# Patient Record
Sex: MALE | Race: WHITE | NOT HISPANIC OR LATINO | Employment: FULL TIME | URBAN - METROPOLITAN AREA
[De-identification: names, ages, dates, MRNs, and addresses within clinical notes are randomized per-mention and may not be internally consistent; named-entity substitution may affect disease eponyms.]

---

## 2023-08-15 LAB
6-ACETYLMORPHINE: <25 NG/ML
7-AMINOCLONAZEPAM: <25 NG/ML
ALPHA-HYDROXYALPRAZOLAM: <25 NG/ML
ALPHA-HYDROXYMIDAZOLAM: <25 NG/ML
ALPRAZOLAM: <25 NG/ML
AMPHETAMINE (PRESENCE) IN URINE BY SCREEN METHOD: ABNORMAL
BARBITURATES PRESENCE IN URINE BY SCREEN METHOD: ABNORMAL
CANNABINOIDS IN URINE BY SCREEN METHOD: ABNORMAL
CHLORDIAZEPOXIDE: <25 NG/ML
CLONAZEPAM: <25 NG/ML
COCAINE (PRESENCE) IN URINE BY SCREEN METHOD: ABNORMAL
CODEINE: <50 NG/ML
CREATINE, URINE FOR DRUG: 179.9 MG/DL
DIAZEPAM: <25 NG/ML
DRUG SCREEN COMMENT URINE: ABNORMAL
EDDP: <25 NG/ML
FENTANYL CONFIRMATION, URINE: <2.5 NG/ML
HYDROCODONE: <25 NG/ML
HYDROMORPHONE: <25 NG/ML
LORAZEPAM: <25 NG/ML
METHADONE CONFIRMATION,URINE: <25 NG/ML
MIDAZOLAM: <25 NG/ML
MORPHINE URINE: <50 NG/ML
NORDIAZEPAM: 325 NG/ML
NORFENTANYL: <2.5 NG/ML
NORHYDROCODONE: <25 NG/ML
NOROXYCODONE: <25 NG/ML
O-DESMETHYLTRAMADOL: <50 NG/ML
OXAZEPAM: 455 NG/ML
OXYCODONE: <25 NG/ML
OXYMORPHONE: <25 NG/ML
PHENCYCLIDINE (PRESENCE) IN URINE BY SCREEN METHOD: ABNORMAL
TEMAZEPAM: 851 NG/ML
TRAMADOL: <50 NG/ML
ZOLPIDEM METABOLITE (ZCA): <25 NG/ML
ZOLPIDEM: <25 NG/ML

## 2023-10-05 PROBLEM — R03.0 BLOOD PRESSURE ELEVATED WITHOUT HISTORY OF HTN: Status: ACTIVE | Noted: 2023-10-05

## 2023-10-05 PROBLEM — M54.12 CERVICAL RADICULOPATHY: Status: ACTIVE | Noted: 2023-10-05

## 2023-10-05 PROBLEM — K59.09 CHRONIC CONSTIPATION: Status: ACTIVE | Noted: 2023-10-05

## 2023-10-05 PROBLEM — J34.2 NASAL SEPTAL DEVIATION: Status: ACTIVE | Noted: 2023-10-05

## 2023-10-05 PROBLEM — J34.89 NASAL OBSTRUCTION: Status: ACTIVE | Noted: 2023-10-05

## 2023-10-05 PROBLEM — J45.909 ASTHMA (HHS-HCC): Status: ACTIVE | Noted: 2023-10-05

## 2023-10-05 PROBLEM — H81.10 BPPV (BENIGN PAROXYSMAL POSITIONAL VERTIGO): Status: ACTIVE | Noted: 2023-10-05

## 2023-10-05 PROBLEM — M47.816 LUMBAR SPONDYLOSIS: Status: ACTIVE | Noted: 2023-10-05

## 2023-10-05 PROBLEM — R35.1 NOCTURIA: Status: ACTIVE | Noted: 2023-10-05

## 2023-10-05 PROBLEM — M47.812 DJD (DEGENERATIVE JOINT DISEASE) OF CERVICAL SPINE: Status: ACTIVE | Noted: 2023-10-05

## 2023-10-05 PROBLEM — J34.3 HYPERTROPHY OF INFERIOR NASAL TURBINATE: Status: ACTIVE | Noted: 2023-10-05

## 2023-10-05 RX ORDER — ALBUTEROL SULFATE 90 UG/1
1-2 AEROSOL, METERED RESPIRATORY (INHALATION)
COMMUNITY
Start: 2014-07-11

## 2023-10-05 RX ORDER — MONTELUKAST SODIUM 10 MG/1
1 TABLET ORAL DAILY
COMMUNITY
Start: 2022-02-10

## 2023-10-05 RX ORDER — DIAZEPAM 10 MG/1
10 TABLET ORAL 2 TIMES DAILY PRN
COMMUNITY
End: 2023-10-09 | Stop reason: SDUPTHER

## 2023-10-05 RX ORDER — UBIDECARENONE 30 MG
CAPSULE ORAL
COMMUNITY

## 2023-10-09 ENCOUNTER — OFFICE VISIT (OUTPATIENT)
Dept: PRIMARY CARE | Facility: CLINIC | Age: 48
End: 2023-10-09
Payer: COMMERCIAL

## 2023-10-09 VITALS
SYSTOLIC BLOOD PRESSURE: 137 MMHG | BODY MASS INDEX: 25.78 KG/M2 | OXYGEN SATURATION: 98 % | DIASTOLIC BLOOD PRESSURE: 82 MMHG | HEART RATE: 70 BPM | HEIGHT: 68 IN | TEMPERATURE: 97.6 F | WEIGHT: 170.1 LBS

## 2023-10-09 DIAGNOSIS — M77.8 LEFT ELBOW TENDINITIS: ICD-10-CM

## 2023-10-09 DIAGNOSIS — M75.102 ROTATOR CUFF SYNDROME OF LEFT SHOULDER: Primary | ICD-10-CM

## 2023-10-09 DIAGNOSIS — H81.10 BENIGN PAROXYSMAL POSITIONAL VERTIGO, UNSPECIFIED LATERALITY: ICD-10-CM

## 2023-10-09 DIAGNOSIS — M25.512 ACUTE PAIN OF LEFT SHOULDER: ICD-10-CM

## 2023-10-09 PROCEDURE — 1036F TOBACCO NON-USER: CPT | Performed by: INTERNAL MEDICINE

## 2023-10-09 PROCEDURE — 99214 OFFICE O/P EST MOD 30 MIN: CPT | Performed by: INTERNAL MEDICINE

## 2023-10-09 RX ORDER — DICLOFENAC SODIUM 75 MG/1
75 TABLET, DELAYED RELEASE ORAL 2 TIMES DAILY PRN
Qty: 30 TABLET | Refills: 2 | Status: SHIPPED | OUTPATIENT
Start: 2023-10-09 | End: 2024-02-07 | Stop reason: ALTCHOICE

## 2023-10-09 RX ORDER — DIAZEPAM 10 MG/1
10 TABLET ORAL 2 TIMES DAILY PRN
Qty: 30 TABLET | Refills: 2 | Status: SHIPPED | OUTPATIENT
Start: 2023-10-09 | End: 2024-02-05 | Stop reason: SDUPTHER

## 2023-10-09 ASSESSMENT — ENCOUNTER SYMPTOMS
ARTHRALGIAS: 1
CHILLS: 0
FATIGUE: 0
NAUSEA: 0
COUGH: 0
DIARRHEA: 0
PALPITATIONS: 0
WHEEZING: 0
SHORTNESS OF BREATH: 0
CONSTIPATION: 0
ABDOMINAL PAIN: 0
FEVER: 0
NUMBNESS: 0

## 2023-10-09 ASSESSMENT — PATIENT HEALTH QUESTIONNAIRE - PHQ9
2. FEELING DOWN, DEPRESSED OR HOPELESS: NOT AT ALL
1. LITTLE INTEREST OR PLEASURE IN DOING THINGS: NOT AT ALL
SUM OF ALL RESPONSES TO PHQ9 QUESTIONS 1 AND 2: 0

## 2023-10-09 ASSESSMENT — PAIN SCALES - GENERAL: PAINLEVEL: 6

## 2023-10-09 NOTE — PROGRESS NOTES
"Subjective   Patient ID: Mika Salamanca is a 47 y.o. male who presents for Follow-up (Left sided shoulder and elbow pain ).    HPI     Left elbow pain, outside of elbow (lateral epicondyle) - started a while back.      Left shoulder pain started a few weeks ago.  Was doing some lifting - did not feel anything at the moment, but started after.  Has been using ice, ibuprofen.  No numbness, tingling.  Pain is radiating down left shoulder into arm.  Has had a similar injury in the past and did PT.  Prefers to do imaging first if possible.    Had called ~ 2 weeks ago for diazepam refill, was not sent in.    Review of Systems   Constitutional:  Negative for chills, fatigue and fever.   Respiratory:  Negative for cough, shortness of breath and wheezing.    Cardiovascular:  Negative for chest pain, palpitations and leg swelling.   Gastrointestinal:  Negative for abdominal pain, constipation, diarrhea and nausea.   Musculoskeletal:  Positive for arthralgias.   Skin:  Negative for rash.   Neurological:  Negative for numbness.       Objective   /82   Pulse 70   Temp 36.4 °C (97.6 °F) (Temporal)   Ht 1.727 m (5' 8\")   Wt 77.2 kg (170 lb 1.6 oz)   SpO2 98%   BMI 25.86 kg/m²     Physical Exam  Constitutional:       Appearance: Normal appearance.   HENT:      Head: Normocephalic and atraumatic.   Cardiovascular:      Rate and Rhythm: Normal rate and regular rhythm.   Pulmonary:      Effort: Pulmonary effort is normal. No respiratory distress.      Breath sounds: Normal breath sounds. No wheezing.   Musculoskeletal:      Comments: Tenderness of left lateral epicondyle, pain reproduced by forearm rotation.  Reduced active ROM of left shoulder, primarily anterior flexion.  Pain radiating along lateral shoulder with rotation, as well as down anterior upper arm into bicep.   Neurological:      General: No focal deficit present.      Mental Status: He is alert and oriented to person, place, and time. Mental status is " at baseline.   Psychiatric:         Mood and Affect: Mood normal.         Behavior: Behavior normal.         Thought Content: Thought content normal.         Judgment: Judgment normal.         Assessment/Plan   Problem List Items Addressed This Visit             ICD-10-CM    BPPV (benign paroxysmal positional vertigo) H81.10    Relevant Medications    diazePAM (Valium) 10 mg tablet     Other Visit Diagnoses         Codes    Rotator cuff syndrome of left shoulder    -  Primary M75.102    Relevant Medications    diclofenac (Voltaren) 75 mg EC tablet    Other Relevant Orders    MR arthrogram shoulder left    XR arthrogram shoulder left    Acute pain of left shoulder     M25.512    Relevant Medications    diclofenac (Voltaren) 75 mg EC tablet    Other Relevant Orders    MR arthrogram shoulder left    XR arthrogram shoulder left    Left elbow tendinitis     M77.8    Relevant Medications    diclofenac (Voltaren) 75 mg EC tablet          Left elbow tendinitis / tennis elbow - advised to use tennis elbow forearm brace.  Rx sent for Voltaren.    Left shoulder rotator cuff syndrome, possible tear with reduced active ROM - Xray and MRI ordered.  Rx for Voltaren.    BPPV - has been on diazepam for many years good good results.  CSA was updated 2/8/23.  OARRS reviewed.  Diazepam refilled.    Follow-up as scheduled.

## 2023-10-25 ENCOUNTER — TELEPHONE (OUTPATIENT)
Dept: PRIMARY CARE | Facility: CLINIC | Age: 48
End: 2023-10-25
Payer: COMMERCIAL

## 2023-10-25 DIAGNOSIS — M25.512 ACUTE PAIN OF LEFT SHOULDER: ICD-10-CM

## 2023-10-25 DIAGNOSIS — M75.102 ROTATOR CUFF SYNDROME OF LEFT SHOULDER: Primary | ICD-10-CM

## 2023-10-25 NOTE — TELEPHONE ENCOUNTER
PT CALLED    Insurance denied MRI L shoulder.  Per letter - pt will need p.t.     Can you place a p.t. referral.

## 2023-10-27 ENCOUNTER — EVALUATION (OUTPATIENT)
Dept: PHYSICAL THERAPY | Facility: CLINIC | Age: 48
End: 2023-10-27
Payer: COMMERCIAL

## 2023-10-27 DIAGNOSIS — M25.512 ACUTE PAIN OF LEFT SHOULDER: Primary | ICD-10-CM

## 2023-10-27 DIAGNOSIS — M75.102 ROTATOR CUFF SYNDROME OF LEFT SHOULDER: ICD-10-CM

## 2023-10-27 PROCEDURE — 97161 PT EVAL LOW COMPLEX 20 MIN: CPT | Mod: GP | Performed by: PHYSICAL THERAPIST

## 2023-10-27 ASSESSMENT — PAIN - FUNCTIONAL ASSESSMENT: PAIN_FUNCTIONAL_ASSESSMENT: 0-10

## 2023-10-27 ASSESSMENT — PAIN SCALES - GENERAL: PAINLEVEL_OUTOF10: 5 - MODERATE PAIN

## 2023-10-27 ASSESSMENT — ENCOUNTER SYMPTOMS
OCCASIONAL FEELINGS OF UNSTEADINESS: 0
DEPRESSION: 0
LOSS OF SENSATION IN FEET: 0

## 2023-10-27 NOTE — LETTER
October 27, 2023     Patient: Mika Salamanca   YOB: 1975   Date of Visit: 10/27/2023       To Whom it May Concern:    Mika Salamanca was seen in my clinic on 10/27/2023. He {Return to school/sport:67516}.    If you have any questions or concerns, please don't hesitate to call.         Sincerely,          Massimo Roach, PT        CC: No Recipients

## 2023-10-27 NOTE — LETTER
October 27, 2023    Massimo Roach, PT  [address]  Rehab Services  [facility] [address]    Patient: Mika Salamanca   YOB: 1975   Date of Visit: 10/27/2023       Dear Yaquelin Roldan Md  [address] [address]    The attached plan of care is being sent to you because your patient’s medical reimbursement requires that you certify the plan of care. Your signature is required to allow uninterrupted insurance coverage.      You may indicate your approval by signing below and faxing this form back to us at Dept Fax: [phone].    Please call Dept: [phone] with any questions or concerns.    Thank you for this referral,        Massimo Roach PT  [address] [address]    Payer: Payor: [employer]THEM / Plan: AN[employer]M HMP / Product Type: *No Product type* /                                                                         Date:     Dear Massimo Roach, PT,     Re: Mr. Mika Salamanca, MRN:[id number]    I certify that I have reviewed the attached plan of care and it is medically necessary for Mr. Mika Salamanca (1975) who is under my care.          ______________________________________                    _________________  Provider name and credentials                                           Date and time                                                                                           Plan of Care 10/27/23   Effective from: 10/27/2023  Effective to: 12/11/2023    Plan ID: [id number]            Participants as of Finalize on 10/27/2023    Name Type Comments Contact Info    Massimo Roach PT Physical Therapist  [phone]       Last Plan Note     Author: Massimo Roach PT Status: Sign when Signing Visit Last edited: 10/27/2023  1:30 PM       Physical Therapy    Physical Therapy Evaluation    Patient Name: Mika Salamanca  MRN: [id number]  Today's Date: 10/27/2023  Time Calculation  Start Time:  1340  Stop Time: 1415  Time Calculation (min): 35 min    Current Problem  Problem List Items Addressed This Visit    None  Visit Diagnoses         Codes    Acute pain of left shoulder    -  Primary M25.512    Relevant Orders    Follow Up In Physical Therapy    Rotator cuff syndrome of left shoulder     M75.102    Relevant Orders    Follow Up In Physical Therapy                  SUBJECTIVE  Subjective   General     Precautions  Precautions  Precautions Comment: none       Chief complaint: Pt reports he has had rotator cuff tears before.  Has had injections for them this feels exactly the same. Pt reports the end of Sept he was lifting up the washing machine or lifting the vacuum over the baby gate.     Pain  Pain Assessment: 0-10  Pain Score: 5 - Moderate pain    Pain ranges:  5-8/10  Increases with: reaching out or behind  Decreases with: nothing  Prior level of function: Ind with ADLs  Current functional level:  Pain limits the patient's ability to everything, picking up anything above shoulder height  Home set up: no concerns  Work status: mortgage underwriting    OBJECTIVE:  Upper Extremity Strength:  Date: eval RIGHT LEFT   Shoulder Flexion 4 4   Shoulder Abduction     Shoulder ER 4 4   Shoulder IR 4 4     Upper Extremity ROM:  Date: eval RIGHT LEFT   Shoulder Flexion 150 80     Joint mobility no assessed  Posture WNL  Palpation tender posterior shoulder and over biceps tendon  Neurological symptoms pain radiating down arm to elbow  Special tests:  SHOULDER RIGHT LEFT   Hawkin's-Jordan  +   Neer Impingement   +   Drop arm  -   Infraspinatus muscle test  -   ER lag sign  -   Lift off  -   Apprehension     Relocation     Cross-body adduction  -   Jerk test  -       Outcome Measures:  Other Measures  Oswestry Disablity Index (RENEE): 54     ASSESSMENT  The pt presents with signs and symptoms consistent with the medical diagnosis of L shoulder pain  The pt has impairments including decreased shoulder ROM and  strength. He has functional limitations which include difficulty with any task over shoulder height, reaching out or backward, lifting anything heavy.  The pt was not feeling well today due to vertigo so therefore eval was limited.  However today he had positive special tests that suggested shoulder impingement over a tear.  Will trial PT for 4-8 weeks.    The pt will benefit from skilled physical therapy to reduce impairments in order to return to prior level of function.     The physical therapy prognosis is good for the patient to achieve their goals.   The pt tolerated therapy treatment today well with no adverse effects.  Barriers to therapy include:  prior shoulder injurioes    PLAN  The pt will be seen 1 days a week for 6-8 weeks.        The pt has been educated about the risks and benefits of physical therapy including manual therapy treatments and gives consent for treatment.     The patient will benefit from physical therapy treatment to include: therapeutic exercises, therapeutic activities, neurological re-education, manual therapy, modalities, and a home exercise program.     The patient's potential to reach their therapy goals is excellent.     TREATMENT:  None today as pt was not feeling well.   Demo supine shoulder flexion and discussed pulleys at home    Goals:  Active       PT Problem       RENEE to 35       Start:  10/27/23    Expected End:  12/11/23            pain no higher than 2/10       Start:  10/27/23    Expected End:  12/11/23            L shoulder flexion AROM to at least 150 deg       Start:  10/27/23    Expected End:  12/11/23            L shoulder strength to at least 4+5       Start:  10/27/23    Expected End:  12/11/23            pt will be able to lift 8# (gallon of milk) without increase shoulder pain       Start:  10/27/23    Expected End:  12/11/23                                            Current Participants as of 10/27/2023    Name Type Comments Contact Info    Massimo Roach, PT  Physical Therapist  [phone]    Signature pending

## 2023-10-27 NOTE — LETTER
October 27, 2023     Patient: Mika Salamanca   YOB: 1975   Date of Visit: 10/27/2023       To Whom It May Concern:    It is my medical opinion that Mika Salamanca {Work release (duty restriction):16245}.    If you have any questions or concerns, please don't hesitate to call.         Sincerely,        Massimo Roach, PT    CC: No Recipients

## 2023-10-27 NOTE — PROGRESS NOTES
Physical Therapy    Physical Therapy Evaluation    Patient Name: Mika Salamanca  MRN: [id number]  Today's Date: 10/27/2023  Time Calculation  Start Time: 1340  Stop Time: 1415  Time Calculation (min): 35 min    Current Problem  Problem List Items Addressed This Visit    None  Visit Diagnoses         Codes    Acute pain of left shoulder    -  Primary M25.512    Relevant Orders    Follow Up In Physical Therapy    Rotator cuff syndrome of left shoulder     M75.102    Relevant Orders    Follow Up In Physical Therapy                  SUBJECTIVE  Subjective   General     Precautions  Precautions  Precautions Comment: none       Chief complaint: Pt reports he has had rotator cuff tears before.  Has had injections for them this feels exactly the same. Pt reports the end of Sept he was lifting up the washing machine or lifting the vacuum over the baby gate.     Pain  Pain Assessment: 0-10  Pain Score: 5 - Moderate pain    Pain ranges:  5-8/10  Increases with: reaching out or behind  Decreases with: nothing  Prior level of function: Ind with ADLs  Current functional level:  Pain limits the patient's ability to everything, picking up anything above shoulder height  Home set up: no concerns  Work status: mortgage underwriting    OBJECTIVE:  Upper Extremity Strength:  Date: eval RIGHT LEFT   Shoulder Flexion 4 4   Shoulder Abduction     Shoulder ER 4 4   Shoulder IR 4 4     Upper Extremity ROM:  Date: eval RIGHT LEFT   Shoulder Flexion 150 80     Joint mobility no assessed  Posture WNL  Palpation tender posterior shoulder and over biceps tendon  Neurological symptoms pain radiating down arm to elbow  Special tests:  SHOULDER RIGHT LEFT   Hawkin's-Jordan  +   Neer Impingement   +   Drop arm  -   Infraspinatus muscle test  -   ER lag sign  -   Lift off  -   Apprehension     Relocation     Cross-body adduction  -   Jerk test  -       Outcome Measures:  Other Measures  Oswestry Disablity Index (RENEE): 54     ASSESSMENT  The pt  presents with signs and symptoms consistent with the medical diagnosis of L shoulder pain  The pt has impairments including decreased shoulder ROM and strength. He has functional limitations which include difficulty with any task over shoulder height, reaching out or backward, lifting anything heavy.  The pt was not feeling well today due to vertigo so therefore eval was limited.  However today he had positive special tests that suggested shoulder impingement over a tear.  Will trial PT for 4-8 weeks.    The pt will benefit from skilled physical therapy to reduce impairments in order to return to prior level of function.     The physical therapy prognosis is good for the patient to achieve their goals.   The pt tolerated therapy treatment today well with no adverse effects.  Barriers to therapy include:  prior shoulder injurioes    PLAN  The pt will be seen 1 days a week for 6-8 weeks.        The pt has been educated about the risks and benefits of physical therapy including manual therapy treatments and gives consent for treatment.     The patient will benefit from physical therapy treatment to include: therapeutic exercises, therapeutic activities, neurological re-education, manual therapy, modalities, and a home exercise program.     The patient's potential to reach their therapy goals is excellent.     TREATMENT:  None today as pt was not feeling well.   Demo supine shoulder flexion and discussed pulleys at home    Goals:  Active       PT Problem       RENEE to 35       Start:  10/27/23    Expected End:  12/11/23            pain no higher than 2/10       Start:  10/27/23    Expected End:  12/11/23            L shoulder flexion AROM to at least 150 deg       Start:  10/27/23    Expected End:  12/11/23            L shoulder strength to at least 4+5       Start:  10/27/23    Expected End:  12/11/23            pt will be able to lift 8# (gallon of milk) without increase shoulder pain       Start:  10/27/23    Expected  End:  12/11/23

## 2023-11-02 ENCOUNTER — APPOINTMENT (OUTPATIENT)
Dept: RADIOLOGY | Facility: HOSPITAL | Age: 48
End: 2023-11-02
Payer: COMMERCIAL

## 2023-11-03 ENCOUNTER — TREATMENT (OUTPATIENT)
Dept: PHYSICAL THERAPY | Facility: CLINIC | Age: 48
End: 2023-11-03
Payer: COMMERCIAL

## 2023-11-03 DIAGNOSIS — M25.512 ACUTE PAIN OF LEFT SHOULDER: ICD-10-CM

## 2023-11-03 DIAGNOSIS — M75.102 ROTATOR CUFF SYNDROME OF LEFT SHOULDER: ICD-10-CM

## 2023-11-03 PROCEDURE — 97140 MANUAL THERAPY 1/> REGIONS: CPT | Mod: GP | Performed by: PHYSICAL THERAPIST

## 2023-11-03 PROCEDURE — 97110 THERAPEUTIC EXERCISES: CPT | Mod: GP | Performed by: PHYSICAL THERAPIST

## 2023-11-03 ASSESSMENT — PAIN SCALES - GENERAL: PAINLEVEL_OUTOF10: 3

## 2023-11-03 ASSESSMENT — PAIN - FUNCTIONAL ASSESSMENT: PAIN_FUNCTIONAL_ASSESSMENT: 0-10

## 2023-11-03 NOTE — PROGRESS NOTES
Physical Therapy    Physical Therapy Treatment    Patient Name: Mika Salamanca  MRN: [id number]  Today's Date: 11/3/2023  Visit #2  Time Calculation  Start Time: 1015  Stop Time: 1100  Time Calculation (min): 45 min    Visit number: 2    Current Problem  Problem List Items Addressed This Visit    None  Visit Diagnoses         Codes    Rotator cuff syndrome of left shoulder     M75.102    Acute pain of left shoulder     M25.512            Subjective :  Pt reports he is doing better today since the vertigo has improved.     Precautions  Precautions  Precautions Comment: none    Pain  Pain Assessment: 0-10  Pain Score: 3    Objective   No objective measures assessed this visit       Assessment:     Pt is  just beginning therapy.  This session exercises all exercises were new or reviewed from the initial HEP.  The pt required min to mod cues and demonstration to complete exercises with proper form.  Pt responded to all activities without adverse effects.  PT tolerated manual well and had piccae over upper trap region.  Pt continues to lack strength and ROM necessary and continues to have pain which limimts the completion of baseline ADLs.  Pt will benefit from further therapy to continue to progress towards meeting functional and impairment goals.       Plan:     Continue to progress treatment to improve strength, range of motion, joint mobility, pain, and flexibility impairments which are impacting patient's function.  Treatments:    Therapeutic ex:  Stepper 5 min for UE  Pulleys 3 min  Seated ball roll out 2 min FWD, 2 min sideways  Pulldowns and mid rows RTB x 20 ea  Shoulder ER and IR RTB x 20  Pull aparts RTB x 10 ea    Neuro Re-ed: none today    Manual:  IASTM to L shoulder in sitting  Pt educated regarding manual therapy risks and benefits.  Pt given opportunity to stop if needed.  Pt aware and agrees.   Access Code: [email or web address]  URL: [email or web address]  Date: 11/03/2023  Prepared by:  Massimo Marley    Exercises  - Standing Shoulder Row with Anchored Resistance  - 1 x daily - 7 x weekly - 3 sets - 10 reps  - Shoulder extension with resistance - Neutral  - 1 x daily - 7 x weekly - 3 sets - 10 reps  - Shoulder Internal Rotation with Resistance  - 1 x daily - 7 x weekly - 3 sets - 10 reps  - Shoulder External Rotation with Anchored Resistance  - 1 x daily - 7 x weekly - 3 sets - 10 reps  - Shoulder External Rotation and Scapular Retraction with Resistance  - 1 x daily - 7 x weekly - 3 sets - 10 reps  - Standing Shoulder Horizontal Abduction with Resistance  - 1 x daily - 7 x weekly - 3 sets - 10 reps  - Standing Shoulder Horizontal Abduction with Resistance  - 1 x daily - 7 x weekly - 3 sets - 10 reps  - Standing Shoulder Diagonal Horizontal Abduction 60/120 Degrees with Resistance  - 1 x daily - 7 x weekly - 3 sets - 10 reps  Goals:  Active       PT Problem       RENEE to 35       Start:  10/27/23    Expected End:  12/11/23            pain no higher than 2/10       Start:  10/27/23    Expected End:  12/11/23            L shoulder flexion AROM to at least 150 deg       Start:  10/27/23    Expected End:  12/11/23            L shoulder strength to at least 4+5       Start:  10/27/23    Expected End:  12/11/23            pt will be able to lift 8# (gallon of milk) without increase shoulder pain       Start:  10/27/23    Expected End:  12/11/23

## 2023-11-06 ENCOUNTER — OFFICE VISIT (OUTPATIENT)
Dept: PRIMARY CARE | Facility: CLINIC | Age: 48
End: 2023-11-06
Payer: COMMERCIAL

## 2023-11-06 VITALS
BODY MASS INDEX: 26.07 KG/M2 | OXYGEN SATURATION: 95 % | WEIGHT: 172 LBS | TEMPERATURE: 96.7 F | SYSTOLIC BLOOD PRESSURE: 126 MMHG | HEIGHT: 68 IN | DIASTOLIC BLOOD PRESSURE: 79 MMHG | HEART RATE: 56 BPM

## 2023-11-06 DIAGNOSIS — H81.10 BENIGN PAROXYSMAL POSITIONAL VERTIGO, UNSPECIFIED LATERALITY: Primary | ICD-10-CM

## 2023-11-06 PROCEDURE — 99213 OFFICE O/P EST LOW 20 MIN: CPT | Performed by: INTERNAL MEDICINE

## 2023-11-06 PROCEDURE — 1036F TOBACCO NON-USER: CPT | Performed by: INTERNAL MEDICINE

## 2023-11-06 ASSESSMENT — ENCOUNTER SYMPTOMS
DIZZINESS: 1
NAUSEA: 0
ARTHRALGIAS: 1
DIARRHEA: 0
PALPITATIONS: 0
FEVER: 0
BACK PAIN: 0
CHILLS: 0
ABDOMINAL PAIN: 0
CONSTIPATION: 0
SHORTNESS OF BREATH: 0
FATIGUE: 0
COUGH: 0
DYSURIA: 0
HEMATURIA: 0
WHEEZING: 0

## 2023-11-06 ASSESSMENT — PAIN SCALES - GENERAL: PAINLEVEL: 0-NO PAIN

## 2023-11-06 NOTE — PROGRESS NOTES
Subjective   Patient ID: Mika Salamanca is a 48 y.o. male who presents for Med Refill (Diazepam refill ).    HPI     Has started PT for left shoulder - no significant changes yet.  Does not need refill on diazepam yet today - he uses occasionally for vertigo, works well.     OARRS:  Yaquelin Roldan MD on 11/6/2023 12:44 PM  I have personally reviewed the OARRS report for Mika Salamanca. I have considered the risks of abuse, dependence, addiction and diversion and I believe that it is clinically appropriate for Mika Salamanca to be prescribed this medication    Is the patient prescribed a combination of a benzodiazepine and opioid?  No    Last Urine Drug Screen / ordered today: No  Recent Results (from the past 8760 hour(s))   OPIATE/OPIOID/BENZO PRESCRIPTION COMPLIANCE    Collection Time: 08/09/23  8:34 AM   Result Value Ref Range    DRUG SCREEN COMMENT URINE SEE BELOW     Creatine, Urine 179.9 mg/dL    Amphetamine Screen, Urine PRESUMPTIVE NEGATIVE NEGATIVE    Barbiturate Screen, Urine PRESUMPTIVE NEGATIVE NEGATIVE    Cannabinoid Screen, Urine PRESUMPTIVE NEGATIVE NEGATIVE    Cocaine Screen, Urine PRESUMPTIVE NEGATIVE NEGATIVE    PCP Screen, Urine PRESUMPTIVE NEGATIVE NEGATIVE    7-Aminoclonazepam <25 Cutoff <25 ng/mL    Alpha-Hydroxyalprazolam <25 Cutoff <25 ng/mL    Alpha-Hydroxymidazolam <25 Cutoff <25 ng/mL    Alprazolam <25 Cutoff <25 ng/mL    Chlordiazepoxide <25 Cutoff <25 ng/mL    Clonazepam <25 Cutoff <25 ng/mL    Diazepam <25 Cutoff <25 ng/mL    Lorazepam <25 Cutoff <25 ng/mL    Midazolam <25 Cutoff <25 ng/mL    Nordiazepam 325 (A) Cutoff <25 ng/mL    Oxazepam 455 (A) Cutoff <25 ng/mL    Temazepam 851 (A) Cutoff <25 ng/mL    Zolpidem <25 Cutoff <25 ng/mL    Zolpidem Metabolite (ZCA) <25 Cutoff <25 ng/mL    6-Acetylmorphine <25 Cutoff <25 ng/mL    Codeine <50 Cutoff <50 ng/mL    Hydrocodone <25 Cutoff <25 ng/mL    Hydromorphone <25 Cutoff <25 ng/mL    Morphine Urine <50 Cutoff <50  "ng/mL    Norhydrocodone <25 Cutoff <25 ng/mL    Noroxycodone <25 Cutoff <25 ng/mL    Oxycodone <25 Cutoff <25 ng/mL    Oxymorphone <25 Cutoff <25 ng/mL    Tramadol <50 Cutoff <50 ng/mL    O-Desmethyltramadol <50 Cutoff <50 ng/mL    Fentanyl <2.5 Cutoff<2.5 ng/mL    Norfentanyl <2.5 Cutoff<2.5 ng/mL    METHADONE CONFIRMATION,URINE <25 Cutoff <25 ng/mL    EDDP <25 Cutoff <25 ng/mL     Results are as expected.         Controlled Substance Agreement:  Date of the Last Agreement: 2/8/23  Reviewed Controlled Substance Agreement including but not limited to the benefits, risks, and alternatives to treatment with a Controlled Substance medication(s).    Benzodiazepines:  What is the patient's goal of therapy? Relief of intermittent vertigo  Is this being achieved with current treatment? yes    DESTIN-7:  No data recorded n/a    Activities of Daily Living:   Is your overall impression that this patient is benefiting (symptom reduction outweighs side effects) from benzodiazepine therapy? Yes     1. Physical Functioning: Better  2. Family Relationship: Better  3. Social Relationship: Better  4. Mood: Better  5. Sleep Patterns: Better  6. Overall Function: Better    Review of Systems   Constitutional:  Negative for chills, fatigue and fever.   Respiratory:  Negative for cough, shortness of breath and wheezing.    Cardiovascular:  Negative for chest pain, palpitations and leg swelling.   Gastrointestinal:  Negative for abdominal pain, constipation, diarrhea and nausea.   Genitourinary:  Negative for dysuria and hematuria.   Musculoskeletal:  Positive for arthralgias. Negative for back pain and gait problem.   Skin:  Negative for rash.   Neurological:  Positive for dizziness.       Objective   /79   Pulse 56   Temp 35.9 °C (96.7 °F) (Temporal)   Ht 1.727 m (5' 8\")   Wt 78 kg (172 lb)   SpO2 95%   BMI 26.15 kg/m²     Physical Exam  Constitutional:       Appearance: Normal appearance.   HENT:      Head: Normocephalic and " atraumatic.   Cardiovascular:      Rate and Rhythm: Normal rate and regular rhythm.   Pulmonary:      Effort: Pulmonary effort is normal. No respiratory distress.      Breath sounds: Normal breath sounds. No wheezing.   Neurological:      General: No focal deficit present.      Mental Status: He is alert and oriented to person, place, and time. Mental status is at baseline.   Psychiatric:         Mood and Affect: Mood normal.         Behavior: Behavior normal.         Thought Content: Thought content normal.         Judgment: Judgment normal.         Assessment/Plan   Problem List Items Addressed This Visit             ICD-10-CM    BPPV (benign paroxysmal positional vertigo) - Primary H81.10     BPPV - has been on diazepam for many years good good results.  CSA was updated 2/8/23.  OARRS reviewed.   UDS 8/9/23.  Does not need refill today.    Left rotator cuff syndrome - currently in PT.  Follow-up if no improvement.    BP is in better range today, will continue to monitor.    Follow-up in 90 days.

## 2023-11-10 ENCOUNTER — TREATMENT (OUTPATIENT)
Dept: PHYSICAL THERAPY | Facility: CLINIC | Age: 48
End: 2023-11-10
Payer: COMMERCIAL

## 2023-11-10 DIAGNOSIS — M25.512 ACUTE PAIN OF LEFT SHOULDER: ICD-10-CM

## 2023-11-10 DIAGNOSIS — M75.102 ROTATOR CUFF SYNDROME OF LEFT SHOULDER: Primary | ICD-10-CM

## 2023-11-10 PROCEDURE — 97140 MANUAL THERAPY 1/> REGIONS: CPT | Mod: GP | Performed by: PHYSICAL THERAPIST

## 2023-11-10 PROCEDURE — 97110 THERAPEUTIC EXERCISES: CPT | Mod: GP | Performed by: PHYSICAL THERAPIST

## 2023-11-10 ASSESSMENT — PAIN - FUNCTIONAL ASSESSMENT: PAIN_FUNCTIONAL_ASSESSMENT: 0-10

## 2023-11-10 ASSESSMENT — PAIN SCALES - GENERAL: PAINLEVEL_OUTOF10: 3

## 2023-11-10 NOTE — PROGRESS NOTES
Physical Therapy    Physical Therapy Treatment    Patient Name: Mika Salamanca  MRN: [id number]  Today's Date: 11/10/2023  Visit #3/7  Time Calculation  Start Time: 1102  Stop Time: 1145  Time Calculation (min): 43 min    Visit number: 2    Current Problem  Problem List Items Addressed This Visit             ICD-10-CM    Rotator cuff syndrome of left shoulder - Primary M75.102    Acute pain of left shoulder M25.512         Subjective :  Pt reports the shoulder is about the same and still limited in AROM however feels like the IASTM helped a little      Precautions  Precautions  Precautions Comment: none    Pain  Pain Assessment: 0-10  Pain Score: 3    Objective   No objective measures assessed this visit       Assessment:     Pt is progressing slowly.  Added a few new exercises today, toelrated well  This session exercises all exercises were new or reviewed from the initial HEP.  The pt required min to mod cues and demonstration to complete exercises with proper form.  Pt responded to all activities without adverse effects.  PT tolerated manual well and had piccae over upper trap region.  Pt continues to lack strength and ROM necessary and continues to have pain which limimts the completion of baseline ADLs.  Pt will benefit from further therapy to continue to progress towards meeting functional and impairment goals.       Plan:     Continue to progress treatment to improve strength, range of motion, joint mobility, pain, and flexibility impairments which are impacting patient's function.    Treatments:  Therapeutic ex:  Stepper 5 min for UE  Pulleys 3 min  Seated ball roll out 2 min FWD, 2 min sideways  Pulldowns and mid rows GTB x 30 ea  Shoulder ER and IR GTB x 20  Pull aparts GTB x 10 ea  ER at door at 45 deg with cane. NEW  Wall walks RTB x 5 NEW    Neuro Re-ed: none today    Manual:  IASTM to L shoulder in sitting  Pt educated regarding manual therapy risks and benefits.  Pt given opportunity to stop if  needed.  Pt aware and agrees.   GOALS:  Active       PT Problem       RENEE to 35       Start:  10/27/23    Expected End:  12/11/23            pain no higher than 2/10       Start:  10/27/23    Expected End:  12/11/23            L shoulder flexion AROM to at least 150 deg       Start:  10/27/23    Expected End:  12/11/23            L shoulder strength to at least 4+5       Start:  10/27/23    Expected End:  12/11/23            pt will be able to lift 8# (gallon of milk) without increase shoulder pain       Start:  10/27/23    Expected End:  12/11/23

## 2023-11-17 ENCOUNTER — TREATMENT (OUTPATIENT)
Dept: PHYSICAL THERAPY | Facility: CLINIC | Age: 48
End: 2023-11-17
Payer: COMMERCIAL

## 2023-11-17 DIAGNOSIS — M25.512 ACUTE PAIN OF LEFT SHOULDER: ICD-10-CM

## 2023-11-17 DIAGNOSIS — M75.102 ROTATOR CUFF SYNDROME OF LEFT SHOULDER: ICD-10-CM

## 2023-11-17 PROCEDURE — 97140 MANUAL THERAPY 1/> REGIONS: CPT | Mod: GP | Performed by: PHYSICAL THERAPIST

## 2023-11-17 PROCEDURE — 97110 THERAPEUTIC EXERCISES: CPT | Mod: GP | Performed by: PHYSICAL THERAPIST

## 2023-11-17 NOTE — PROGRESS NOTES
Physical Therapy    Physical Therapy Treatment    Patient Name: Mika Salamanca  MRN: [id number]  Today's Date: 11/17/2023  Visit #4/7  Time Calculation  Start Time: 1101  Stop Time: 1140  Time Calculation (min): 39 min        Current Problem  Problem List Items Addressed This Visit             ICD-10-CM    Rotator cuff syndrome of left shoulder M75.102    Acute pain of left shoulder M25.512         Subjective :  Pt reports he was doing well until this morning when he tried to pull his shirt off over his head.  Pain much higher again. No number given    Precautions       Pain       Objective   No objective measures assessed this visit       Assessment:     Pt is progressing slowly.  Only did stretching today and spent more time on manual trying to increase blood flow for decreased pain.  The pt required min to mod cues and demonstration to complete exercises with proper form.  Pt responded to all activities without adverse effects.  PT tolerated manual well and  was educated about bruising and tape  Pt continues to lack strength and ROM necessary and continues to have pain which limimts the completion of baseline ADLs.  Pt will benefit from further therapy to continue to progress towards meeting functional and impairment goals.       Plan:     Continue to progress treatment to improve strength, range of motion, joint mobility, pain, and flexibility impairments which are impacting patient's function.    Treatments:  Therapeutic ex:  Stepper 5 min for UE  Pulleys 3 min  Seated ball roll out 2 min FWD, 2 min sideways  ER at door at 45 deg with cane. NEW\  Kinesiotape applied to increase blood flow  HELD  Pulldowns and mid rows GTB x 30 ea  Shoulder ER and IR GTB x 20  Pull aparts GTB x 10 ea  Wall walks RTB x 5 NEW    Neuro Re-ed: none today    Manual:  Cupping  for 4 min statis and 4 min gliding.  To L shoulder in sitting  Pt educated regarding manual therapy risks and benefits.  Pt given opportunity to stop if  needed.  Pt aware and agrees.   GOALS:  Active       PT Problem       RENEE to 35       Start:  10/27/23    Expected End:  12/11/23            pain no higher than 2/10       Start:  10/27/23    Expected End:  12/11/23            L shoulder flexion AROM to at least 150 deg       Start:  10/27/23    Expected End:  12/11/23            L shoulder strength to at least 4+5       Start:  10/27/23    Expected End:  12/11/23            pt will be able to lift 8# (gallon of milk) without increase shoulder pain       Start:  10/27/23    Expected End:  12/11/23

## 2023-11-24 ENCOUNTER — DOCUMENTATION (OUTPATIENT)
Dept: PHYSICAL THERAPY | Facility: CLINIC | Age: 48
End: 2023-11-24

## 2023-11-24 ENCOUNTER — TELEMEDICINE (OUTPATIENT)
Dept: PRIMARY CARE | Facility: CLINIC | Age: 48
End: 2023-11-24
Payer: COMMERCIAL

## 2023-11-24 ENCOUNTER — APPOINTMENT (OUTPATIENT)
Dept: PHYSICAL THERAPY | Facility: CLINIC | Age: 48
End: 2023-11-24
Payer: COMMERCIAL

## 2023-11-24 DIAGNOSIS — J01.90 ACUTE NON-RECURRENT SINUSITIS, UNSPECIFIED LOCATION: Primary | ICD-10-CM

## 2023-11-24 PROCEDURE — 99213 OFFICE O/P EST LOW 20 MIN: CPT

## 2023-11-24 RX ORDER — AMOXICILLIN AND CLAVULANATE POTASSIUM 875; 125 MG/1; MG/1
875 TABLET, FILM COATED ORAL 2 TIMES DAILY
Qty: 20 TABLET | Refills: 0 | Status: SHIPPED | OUTPATIENT
Start: 2023-11-24 | End: 2023-12-04

## 2023-11-24 NOTE — PROGRESS NOTES
Subjective   Patient ID: Mika Salamanca is a 48 y.o. male who presents for Sinusitis (On demand virtual care visit).  HPI  Mika presents via on demand virtual visit for sinus symptoms that started 11/14/2023.  He has experienced fever, sore throat, congestion, runny nose, and cough.   Fever just started.   Sinus pressure in face, under eyes and in forehead.  He has tired mucinex, sinus cold medication.   No chills or body aches.  Mostly drainage down his throat and into chest.   Mucous is green in color when he coughs it up or blows his nose.   Does not feel like he is getting better since it started.     Past Surgical History:   Procedure Laterality Date    OTHER SURGICAL HISTORY  07/11/2014    Leg Repair    OTHER SURGICAL HISTORY  04/24/2019    Complete colonoscopy      Past Medical History:   Diagnosis Date    Abdominal distension (gaseous) 03/21/2022    Abdominal bloating    Abnormal findings on diagnostic imaging of other abdominal regions, including retroperitoneum 04/04/2019    Abnormal CT of the abdomen    COVID-19 08/01/2022    COVID-19 virus infection    Generalized abdominal pain 03/15/2019    Generalized abdominal pain    Lumbago with sciatica, right side 03/09/2017    Acute right-sided low back pain with right-sided sciatica    Metatarsalgia, left foot 07/11/2014    Metatarsalgia of left foot    Myalgia, other site 04/14/2021    Gluteal pain    Other conditions influencing health status 02/22/2018    History of cough    Other general symptoms and signs 03/19/2019    Abnormal finding    Pain in right knee 03/09/2017    Acute pain of right knee    Pelvic and perineal pain 03/15/2019    Pelvic pain in male    Personal history of other diseases of male genital organs 08/08/2018    History of prostatitis    Personal history of other diseases of the digestive system 04/04/2019    History of ileus    Personal history of other diseases of the musculoskeletal system and connective tissue 03/15/2019     History of back pain    Personal history of other diseases of the respiratory system 09/20/2019    History of acute sinusitis    Personal history of other diseases of the respiratory system     History of asthma    Personal history of other diseases of the respiratory system     History of bronchitis    Personal history of other diseases of the respiratory system 04/20/2015    History of acute sinusitis    Personal history of other specified conditions 04/04/2019    History of abdominal pain    Personal history of other specified conditions 09/16/2019    History of fatigue    Right upper quadrant pain 02/10/2022    RUQ pain    Strain of muscle, fascia and tendon of the posterior muscle group at thigh level, left thigh, subsequent encounter 06/16/2021    Hamstring strain, left, subsequent encounter    Strain of muscle, fascia and tendon of the posterior muscle group at thigh level, right thigh, subsequent encounter 05/12/2021    Partial hamstring tear, right, subsequent encounter    Strain of muscle, fascia and tendon of the posterior muscle group at thigh level, unspecified thigh, initial encounter 04/13/2021    Hamstring tear    Strain of muscle, fascia and tendon of the posterior muscle group at thigh level, unspecified thigh, initial encounter 05/26/2021    Hamstring tear    Strain of other muscles, fascia and tendons at shoulder and upper arm level, right arm, initial encounter 01/21/2016    Strain of right trapezius muscle    Strain of unspecified muscle, fascia and tendon at shoulder and upper arm level, right arm, initial encounter 07/11/2014    Right shoulder strain     Social History     Tobacco Use    Smoking status: Former     Types: Cigarettes    Smokeless tobacco: Never   Substance Use Topics    Alcohol use: Yes     Comment: 4-6 per week    Drug use: Never        Review of Systems  10 point review of systems performed and is negative except as noted in the HPI.      Current Outpatient Medications:      albuterol 90 mcg/actuation inhaler, Inhale 1-2 puffs. every 4-6 hours as needed for wheezing, Disp: , Rfl:     amoxicillin-pot clavulanate (Augmentin) 875-125 mg tablet, Take 1 tablet (875 mg) by mouth 2 times a day for 10 days., Disp: 20 tablet, Rfl: 0    diazePAM (Valium) 10 mg tablet, Take 1 tablet (10 mg) by mouth 2 times a day as needed for anxiety (vertigo)., Disp: 30 tablet, Rfl: 2    diclofenac (Voltaren) 75 mg EC tablet, Take 1 tablet (75 mg) by mouth 2 times a day as needed (pain). Do not crush, chew, or split., Disp: 30 tablet, Rfl: 2    L. acidophilus/Bifid. animalis 32 billion cell capsule, Probiotic CAPS  Refills: 0      Start : 4-Apr-2019  Active, Disp: , Rfl:     montelukast (Singulair) 10 mg tablet, Take 1 tablet (10 mg) by mouth once daily. As directed, Disp: , Rfl:     multivit-min-FA-lycopen-lutein (ESSENTIAL Man) 0.4-2-250 mg-mg-mcg tablet, Multi For Him Oral Tablet  Refills: 0     Active, Disp: , Rfl:      Objective   There were no vitals taken for this visit.    Physical Exam - virtual     Assessment/Plan   Problem List Items Addressed This Visit    None  Visit Diagnoses       Acute non-recurrent sinusitis, unspecified location    -  Primary    Relevant Medications    amoxicillin-pot clavulanate (Augmentin) 875-125 mg tablet        Sinusitis   Start augmentin - twice a day for 10 days   Continue to increase fluids   Discussed following up with PCP if symptoms do not improve  If symptoms change or worsen please go to urgent care or the ER    Discussed at visit any disease processes that were of concern as well as the risks, benefits and instructions on any new medication provided. Patient (and/or caretaker of patient if present) stated all questions were answered, and they voiced understanding of instructions.      A video visit between the patient (at the originating site) and the provider (at the distant site) was utilized to provide this telehealth service.     Verbal consent was requested  and obtained from the patient on this date for a telehealth visit. The patient was informed about the telehealth clinical encounter including benefits to avoiding travel, limitations to the assessment, and billing for the service. In person care may be recommended if needed. Telehealth sessions are not being recorded and personal health information is protected. All questions were answered and verbal informal consent was obtained from the patient to proceed.     Total time spent on phone with patient: 4 minutes  Total time spent documentin minutes

## 2023-11-24 NOTE — PROGRESS NOTES
Physical Therapy                 Therapy Communication Note    Patient Name: Mika Salamanca  MRN: [id number]  Today's Date: 11/24/2023     Discipline: Physical Therapy    Missed Visit Reason:  ill    Missed Time: Cancel    Comment:

## 2023-12-01 ENCOUNTER — TREATMENT (OUTPATIENT)
Dept: PHYSICAL THERAPY | Facility: CLINIC | Age: 48
End: 2023-12-01
Payer: COMMERCIAL

## 2023-12-01 DIAGNOSIS — M25.512 ACUTE PAIN OF LEFT SHOULDER: ICD-10-CM

## 2023-12-01 DIAGNOSIS — M75.102 ROTATOR CUFF SYNDROME OF LEFT SHOULDER: ICD-10-CM

## 2023-12-01 PROCEDURE — 97140 MANUAL THERAPY 1/> REGIONS: CPT | Mod: GP | Performed by: PHYSICAL THERAPIST

## 2023-12-01 PROCEDURE — 97110 THERAPEUTIC EXERCISES: CPT | Mod: GP | Performed by: PHYSICAL THERAPIST

## 2023-12-01 NOTE — PROGRESS NOTES
Physical Therapy    Physical Therapy Treatment    Patient Name: Mika Salamanca  MRN: [id number]  Today's Date: 12/1/2023  Visit #5/7  Time Calculation  Start Time: 1420  Stop Time: 1500  Time Calculation (min): 40 min        Current Problem  Problem List Items Addressed This Visit             ICD-10-CM    Rotator cuff syndrome of left shoulder M75.102    Acute pain of left shoulder M25.512         Subjective :  Pt reports he is better than last time but still sore. No pain number given  Precautions       Pain       Objective   No objective measures assessed this visit       Assessment:     Pt is progressing slowly.  Continued to focus on manual as pt is compliant with his HEP.    Pt responded to all activities without adverse effects.  PT tolerated manual well and  was educated about bruising.  Pt continues to lack strength and ROM necessary and continues to have pain which limimts the completion of baseline ADLs.  Pt will benefit from further therapy to continue to progress towards meeting functional and impairment goals.       Plan:     Continue to progress treatment to improve strength, range of motion, joint mobility, pain, and flexibility impairments which are impacting patient's function.    Treatments:  Therapeutic ex:  Stepper 5 min for UE  Pulleys 3 min  HELD:  Seated ball roll out 2 min FWD, 2 min sideways  ER at door at 45 deg with cane. NEW\  Kinesiotape applied to increase blood flow  Pulldowns and mid rows GTB x 30 ea  Shoulder ER and IR GTB x 20  Pull aparts GTB x 10 ea  Wall walks RTB x 5 NEW    Neuro Re-ed: none today  NV add body blade and ball drop/catch  Manual:  STM and IASTM to L shoulder, RTC, rhomboids and upper trap  Cupping  for 4 min statis and 4 min gliding.  To L shoulder in sitting  Pt educated regarding manual therapy risks and benefits.  Pt given opportunity to stop if needed.  Pt aware and agrees.   GOALS:  Active       PT Problem       RENEE to 35       Start:  10/27/23     Expected End:  12/11/23            pain no higher than 2/10       Start:  10/27/23    Expected End:  12/11/23            L shoulder flexion AROM to at least 150 deg       Start:  10/27/23    Expected End:  12/11/23            L shoulder strength to at least 4+5       Start:  10/27/23    Expected End:  12/11/23            pt will be able to lift 8# (gallon of milk) without increase shoulder pain       Start:  10/27/23    Expected End:  12/11/23

## 2023-12-08 ENCOUNTER — TREATMENT (OUTPATIENT)
Dept: PHYSICAL THERAPY | Facility: CLINIC | Age: 48
End: 2023-12-08
Payer: COMMERCIAL

## 2023-12-08 DIAGNOSIS — M75.102 ROTATOR CUFF SYNDROME OF LEFT SHOULDER: ICD-10-CM

## 2023-12-08 DIAGNOSIS — M25.512 ACUTE PAIN OF LEFT SHOULDER: ICD-10-CM

## 2023-12-08 PROCEDURE — 97110 THERAPEUTIC EXERCISES: CPT | Mod: GP | Performed by: PHYSICAL THERAPIST

## 2023-12-08 PROCEDURE — 97112 NEUROMUSCULAR REEDUCATION: CPT | Mod: GP | Performed by: PHYSICAL THERAPIST

## 2023-12-08 PROCEDURE — 97140 MANUAL THERAPY 1/> REGIONS: CPT | Mod: GP | Performed by: PHYSICAL THERAPIST

## 2023-12-08 NOTE — PROGRESS NOTES
Physical Therapy    Physical Therapy Treatment    Patient Name: Mika Salamanca  MRN: [id number]  Today's Date: 12/8/2023  Visit #6/7  Time Calculation  Start Time: 1102  Stop Time: 1145  Time Calculation (min): 43 min        Current Problem  Problem List Items Addressed This Visit             ICD-10-CM    Rotator cuff syndrome of left shoulder M75.102    Acute pain of left shoulder M25.512         Subjective :  Pt reports he is better than last time but still sore. No pain number given  Precautions       Pain       Objective   No objective measures assessed this visit       Assessment:     Pt is progressing slowly.  Continued to focus on manual as pt is compliant with his HEP.    Pt responded to all activities without adverse effects.  PT tolerated manual well and  was educated about bruising.  Pt continues to lack strength and ROM necessary and continues to have pain which limimts the completion of baseline ADLs.  Pt will benefit from further therapy to continue to progress towards meeting functional and impairment goals.       Plan:     Continue to progress treatment to improve strength, range of motion, joint mobility, pain, and flexibility impairments which are impacting patient's function.    Treatments:  Therapeutic ex:  Stepper 5 min for UE  Pulleys 3 min  Is, Ys and Ts 1# x 30    HELD:  Seated ball roll out 2 min FWD, 2 min sideways  ER at door at 45 deg with cane. NEW  Kinesiotape applied to increase blood flow  Pulldowns and mid rows GTB x 30 ea  Shoulder ER and IR GTB x 20  Pull aparts GTB x 10 ea  Wall walks RTB x 5 NEW    Neuro Re-ed:   NV add body blade and ball drop/catch 1 min ea x 2 rounds  Manual:  STM to L shoulder, RTC, rhomboids and upper trap  Cupping  for 4 min statis and 4 min gliding.  To L shoulder in sitting  Pt educated regarding manual therapy risks and benefits.  Pt given opportunity to stop if needed.  Pt aware and agrees.   GOALS:  Active       PT Problem       RENEE to 35        Start:  10/27/23    Expected End:  12/11/23            pain no higher than 2/10       Start:  10/27/23    Expected End:  12/11/23            L shoulder flexion AROM to at least 150 deg       Start:  10/27/23    Expected End:  12/11/23            L shoulder strength to at least 4+5       Start:  10/27/23    Expected End:  12/11/23            pt will be able to lift 8# (gallon of milk) without increase shoulder pain       Start:  10/27/23    Expected End:  12/11/23

## 2023-12-15 ENCOUNTER — TREATMENT (OUTPATIENT)
Dept: PHYSICAL THERAPY | Facility: CLINIC | Age: 48
End: 2023-12-15
Payer: COMMERCIAL

## 2023-12-15 DIAGNOSIS — M75.102 ROTATOR CUFF SYNDROME OF LEFT SHOULDER: ICD-10-CM

## 2023-12-15 DIAGNOSIS — M25.512 ACUTE PAIN OF LEFT SHOULDER: ICD-10-CM

## 2023-12-15 PROCEDURE — 97110 THERAPEUTIC EXERCISES: CPT | Mod: GP | Performed by: PHYSICAL THERAPIST

## 2023-12-15 PROCEDURE — 97140 MANUAL THERAPY 1/> REGIONS: CPT | Mod: GP | Performed by: PHYSICAL THERAPIST

## 2023-12-15 PROCEDURE — 97112 NEUROMUSCULAR REEDUCATION: CPT | Mod: GP | Performed by: PHYSICAL THERAPIST

## 2023-12-15 NOTE — PROGRESS NOTES
Physical Therapy    Physical Therapy Treatment and discharge    Patient Name: Mika Salamanca  MRN: [id number]  Today's Date: 12/15/2023  Visit #7/7  Time Calculation  Start Time: 1017  Stop Time: 1101  Time Calculation (min): 44 min        Current Problem  Problem List Items Addressed This Visit             ICD-10-CM    Rotator cuff syndrome of left shoulder M75.102    Acute pain of left shoulder M25.512         Subjective :  Pt reports he is the same.  Pain has been about a 6/10  Precautions       Pain       Objective   Upper Extremity Strength:  Date: eval RIGHT LEFT   Shoulder Flexion 4+ 4 pain   Shoulder Abduction       Shoulder ER 4+ 4   Shoulder IR 4+ 4      Upper Extremity ROM:  Date: eval RIGHT LEFT   Shoulder Flexion 150 120            Outcome Measures:  Other Measures  DASH 26, a little worse          Assessment:        Pt was referred for shoulder pain.  Pt has progressed slowly.  He continues to have pain in the left shoulder at rest and with most activities.  Strength did not improve much due to pain and being unable to tolerate much resistance.  ROM improved moderately but is now WNL.       He is completely independent with HEP.  Pt may benefit from imaging to determine extent of soft tissue injury in shoulder and referral to ortho for further follow-up  Plan:  The POC is discharged as of today.     Plan:     Discharge to [facility], referred back to primary    Treatments:  Therapeutic ex:  Stepper 5 min for UE  Pulleys 3 min  Is, Ys and Ts 1# x 30 HELD  Seated ball roll out 2 min FWD, 2 min sideways HELD  ER at door at 45 deg with cane.HELD   Kinesiotape applied to increase blood flow HELD  Pulldowns and mid rows BlackTB x 30 ea  Shoulder ER and IR BlackTB x 20  Pull aparts blackTB x 10 ea  Wall walks RTB x 5     Neuro Re-ed:    body blade and ball drop/catch 1 min ea x 2 rounds    Manual:  STM to L shoulder, RTC, rhomboids and upper trap  Cupping  for 4 min static and 4 min gliding.  To L shoulder in  sitting  Pt educated regarding manual therapy risks and benefits.  Pt given opportunity to stop if needed.  Pt aware and agrees.   GOALS:  Active       PT Problem       DASH to 35 (Progressing)       Start:  10/27/23    Expected End:  12/11/23       DASH 26         pain no higher than 2/10 (Not Progressing)       Start:  10/27/23    Expected End:  12/11/23         Goal Note       Pain has been a 6/10 the past week              L shoulder flexion AROM to at least 150 deg (Progressing)       Start:  10/27/23    Expected End:  12/11/23         Goal Note       ROM to 120              L shoulder strength to at least 4+5 (Not Progressing)       Start:  10/27/23    Expected End:  12/11/23         Goal Note       Unable to fully strength test due to pain              pt will be able to lift 8# (gallon of milk) without increase shoulder pain (Not Progressing)       Start:  10/27/23    Expected End:  12/11/23         Goal Note       Unable to lift anything heavy past his elbow                  Statement Selected

## 2024-01-05 ENCOUNTER — TELEPHONE (OUTPATIENT)
Dept: PRIMARY CARE | Facility: CLINIC | Age: 49
End: 2024-01-05
Payer: COMMERCIAL

## 2024-01-05 NOTE — TELEPHONE ENCOUNTER
Patient called stating his insurance denied the MRI until patient did P.T.  Patient states his physical therapist said to contact his physician.  Patient still having issues.  Patient asking if the MRI order is still good or if he needs a new order.  Patient phone [phone].

## 2024-01-09 DIAGNOSIS — M25.512 ACUTE PAIN OF LEFT SHOULDER: ICD-10-CM

## 2024-01-09 DIAGNOSIS — M75.102 ROTATOR CUFF SYNDROME OF LEFT SHOULDER: Primary | ICD-10-CM

## 2024-01-09 NOTE — TELEPHONE ENCOUNTER
Pt called again.  He was looking in MyChart that the MRI is without contrast.  Pt stated it was originally scheduled with contrast.    Please clarify

## 2024-01-12 NOTE — TELEPHONE ENCOUNTER
Pt called   I read pt drs reply today  He was told by physical therapist it should be with contrast - concerned about possible torn labium.    Pt was advised  does not have Friday office hours and message would be responded on 1/15/24    Pt# [phone]

## 2024-01-29 ENCOUNTER — APPOINTMENT (OUTPATIENT)
Dept: RADIOLOGY | Facility: CLINIC | Age: 49
End: 2024-01-29
Payer: COMMERCIAL

## 2024-02-02 RX ORDER — IBUPROFEN 200 MG
600 TABLET ORAL EVERY 6 HOURS PRN
COMMUNITY

## 2024-02-02 RX ORDER — HYDROCODONE BITARTRATE AND ACETAMINOPHEN 5; 325 MG/1; MG/1
1 TABLET ORAL
COMMUNITY
Start: 2023-08-23

## 2024-02-05 ENCOUNTER — OFFICE VISIT (OUTPATIENT)
Dept: PRIMARY CARE | Facility: CLINIC | Age: 49
End: 2024-02-05
Payer: COMMERCIAL

## 2024-02-05 VITALS
HEART RATE: 71 BPM | TEMPERATURE: 97.1 F | SYSTOLIC BLOOD PRESSURE: 160 MMHG | DIASTOLIC BLOOD PRESSURE: 93 MMHG | WEIGHT: 176 LBS | BODY MASS INDEX: 26.67 KG/M2 | OXYGEN SATURATION: 98 % | HEIGHT: 68 IN

## 2024-02-05 DIAGNOSIS — H81.10 BENIGN PAROXYSMAL POSITIONAL VERTIGO, UNSPECIFIED LATERALITY: ICD-10-CM

## 2024-02-05 DIAGNOSIS — J45.20 MILD INTERMITTENT ASTHMA WITHOUT COMPLICATION (HHS-HCC): Primary | ICD-10-CM

## 2024-02-05 PROCEDURE — 99213 OFFICE O/P EST LOW 20 MIN: CPT | Performed by: INTERNAL MEDICINE

## 2024-02-05 PROCEDURE — 1036F TOBACCO NON-USER: CPT | Performed by: INTERNAL MEDICINE

## 2024-02-05 RX ORDER — DIAZEPAM 10 MG/1
10 TABLET ORAL 2 TIMES DAILY PRN
Qty: 30 TABLET | Refills: 1 | Status: SHIPPED | OUTPATIENT
Start: 2024-02-05

## 2024-02-05 ASSESSMENT — PATIENT HEALTH QUESTIONNAIRE - PHQ9
SUM OF ALL RESPONSES TO PHQ9 QUESTIONS 1 AND 2: 0
2. FEELING DOWN, DEPRESSED OR HOPELESS: NOT AT ALL
1. LITTLE INTEREST OR PLEASURE IN DOING THINGS: NOT AT ALL

## 2024-02-05 ASSESSMENT — PAIN SCALES - GENERAL: PAINLEVEL: 0-NO PAIN

## 2024-02-05 NOTE — PROGRESS NOTES
CHIEF COMPLAINT  Follow-up (Diazepam follow up )    HISTORY OF PRESENT ILLNESS  Mika Salamanca is a 48 y.o. male presents today for follow up of Follow-up (Diazepam follow up )    HPI    Completed PT for left shoulder pain.  No improvement.   MRI ordered to assess for possible labrum tear - this is scheduled.    BP is elevated today.  Has not been as active lately as he would like to be.  His knee bothers him occasionally as well, plans to follow-up with orthopedist.     Continues to have occasional flare-ups of vertigo - the diazepam helps for flare-ups.   Needs refill today.  If there is any significant change in frequency or severity of flare-ups, should reassess.  Has had these symptoms for years.     OARRS:  Yaquelin Roldan MD on 2/5/2024  3:50 PM  I have personally reviewed the OARRS report for Mika Salamanca. I have considered the risks of abuse, dependence, addiction and diversion and I believe that it is clinically appropriate for Mika Salamanca to be prescribed this medication    Is the patient prescribed a combination of a benzodiazepine and opioid?  No    Last Urine Drug Screen / ordered today: No  Recent Results (from the past 8760 hour(s))   OPIATE/OPIOID/BENZO PRESCRIPTION COMPLIANCE    Collection Time: 08/09/23  8:34 AM   Result Value Ref Range    DRUG SCREEN COMMENT URINE SEE BELOW     Creatine, Urine 179.9 mg/dL    Amphetamine Screen, Urine PRESUMPTIVE NEGATIVE NEGATIVE    Barbiturate Screen, Urine PRESUMPTIVE NEGATIVE NEGATIVE    Cannabinoid Screen, Urine PRESUMPTIVE NEGATIVE NEGATIVE    Cocaine Screen, Urine PRESUMPTIVE NEGATIVE NEGATIVE    PCP Screen, Urine PRESUMPTIVE NEGATIVE NEGATIVE    7-Aminoclonazepam <25 Cutoff <25 ng/mL    Alpha-Hydroxyalprazolam <25 Cutoff <25 ng/mL    Alpha-Hydroxymidazolam <25 Cutoff <25 ng/mL    Alprazolam <25 Cutoff <25 ng/mL    Chlordiazepoxide <25 Cutoff <25 ng/mL    Clonazepam <25 Cutoff <25 ng/mL    Diazepam <25 Cutoff <25 ng/mL     Lorazepam <25 Cutoff <25 ng/mL    Midazolam <25 Cutoff <25 ng/mL    Nordiazepam 325 (A) Cutoff <25 ng/mL    Oxazepam 455 (A) Cutoff <25 ng/mL    Temazepam 851 (A) Cutoff <25 ng/mL    Zolpidem <25 Cutoff <25 ng/mL    Zolpidem Metabolite (ZCA) <25 Cutoff <25 ng/mL    6-Acetylmorphine <25 Cutoff <25 ng/mL    Codeine <50 Cutoff <50 ng/mL    Hydrocodone <25 Cutoff <25 ng/mL    Hydromorphone <25 Cutoff <25 ng/mL    Morphine Urine <50 Cutoff <50 ng/mL    Norhydrocodone <25 Cutoff <25 ng/mL    Noroxycodone <25 Cutoff <25 ng/mL    Oxycodone <25 Cutoff <25 ng/mL    Oxymorphone <25 Cutoff <25 ng/mL    Tramadol <50 Cutoff <50 ng/mL    O-Desmethyltramadol <50 Cutoff <50 ng/mL    Fentanyl <2.5 Cutoff<2.5 ng/mL    Norfentanyl <2.5 Cutoff<2.5 ng/mL    METHADONE CONFIRMATION,URINE <25 Cutoff <25 ng/mL    EDDP <25 Cutoff <25 ng/mL     Results are as expected.         Controlled Substance Agreement:  Date of the Last Agreement: 24  Reviewed Controlled Substance Agreement including but not limited to the benefits, risks, and alternatives to treatment with a Controlled Substance medication(s).    Benzodiazepines:  What is the patient's goal of therapy? Relief of vertigo   Is this being achieved with current treatment? yes      Activities of Daily Living:   Is your overall impression that this patient is benefiting (symptom reduction outweighs side effects) from benzodiazepine therapy?     1. Physical Functionin. Family Relationship: Same  3. Social Relationship: Same  4. Mood: Same  5. Sleep Patterns: Same  6. Overall Function: Better    REVIEW OF SYSTEMS  Review of Systems   Constitutional:  Negative for chills, fatigue and fever.   Respiratory:  Negative for cough, shortness of breath and wheezing.    Cardiovascular:  Negative for chest pain, palpitations and leg swelling.   Gastrointestinal:  Negative for abdominal pain, constipation, diarrhea and nausea.   Genitourinary:  Negative for dysuria and hematuria.  "  Musculoskeletal:  Positive for arthralgias. Negative for back pain and gait problem.   Skin:  Negative for rash.   Neurological:  Negative for headaches.       ALLERGIES  Sulfamethoxazole-trimethoprim    MEDICATIONS  Current Outpatient Medications   Medication Instructions    albuterol 90 mcg/actuation inhaler 1-2 puffs, inhalation, every 4-6 hours as needed for wheezing<BR>    diazePAM (VALIUM) 10 mg, oral, 2 times daily PRN    HYDROcodone-acetaminophen (Norco) 5-325 mg tablet 1 tablet, oral    ibuprofen (Motrin IB) 200 mg tablet     L. acidophilus/Bifid. animalis 32 billion cell capsule Probiotic CAPS   Refills: 0        Start : 4-Apr-2019   Active    montelukast (Singulair) 10 mg tablet 1 tablet, oral, Daily, As directed    multivit-min-FA-lycopen-lutein (ESSENTIAL Man) 0.4-2-250 mg-mg-mcg tablet Multi For Him Oral Tablet   Refills: 0       Active       TOBACCO USE  Social History     Tobacco Use   Smoking Status Former    Types: Cigarettes   Smokeless Tobacco Never       DEPRESSION SCREEN  Over the past 2 weeks, how often have you been bothered by any of the following problems?  Little interest or pleasure in doing things: Not at all  Feeling down, depressed, or hopeless: Not at all    SURGICAL HISTORY  Past Surgical History:  07/11/2014: OTHER SURGICAL HISTORY      Comment:  Leg Repair  04/24/2019: OTHER SURGICAL HISTORY      Comment:  Complete colonoscopy       OBJECTIVE    BP (!) 160/93   Pulse 71   Temp 36.2 °C (97.1 °F) (Temporal)   Ht 1.727 m (5' 8\")   Wt 79.8 kg (176 lb)   SpO2 98%   BMI 26.76 kg/m²    BMI: Estimated body mass index is 26.76 kg/m² as calculated from the following:    Height as of this encounter: 1.727 m (5' 8\").    Weight as of this encounter: 79.8 kg (176 lb).    BP Readings from Last 3 Encounters:   02/05/24 (!) 160/93   11/06/23 126/79   10/09/23 137/82      Wt Readings from Last 3 Encounters:   02/05/24 79.8 kg (176 lb)   11/06/23 78 kg (172 lb)   10/09/23 77.2 kg (170 lb 1.6 " oz)        PHYSICAL EXAM  Physical Exam  Constitutional:       Appearance: Normal appearance.   HENT:      Head: Normocephalic and atraumatic.   Cardiovascular:      Rate and Rhythm: Normal rate and regular rhythm.      Heart sounds: No murmur heard.  Pulmonary:      Effort: Pulmonary effort is normal. No respiratory distress.      Breath sounds: Normal breath sounds. No wheezing.   Musculoskeletal:      Right lower leg: No edema.      Left lower leg: No edema.   Skin:     Findings: No rash.   Neurological:      General: No focal deficit present.      Mental Status: He is alert and oriented to person, place, and time. Mental status is at baseline.   Psychiatric:         Mood and Affect: Mood normal.         Behavior: Behavior normal.         Thought Content: Thought content normal.         Judgment: Judgment normal.          ASSESSMENT AND PLAN  Assessment/Plan   Problem List Items Addressed This Visit       Asthma - Primary    BPPV (benign paroxysmal positional vertigo)    Relevant Medications    diazePAM (Valium) 10 mg tablet     BPPV - has been on diazepam for many years good good results.  CSA was updated 2/5/24.  OARRS reviewed.   UDS 8/9/23.  Refill sent.  Continue follow-up every 90 days.    Asthma - no active issues, uses albuterol PRN.    MRI to evaluate left shoulder is scheduled.

## 2024-02-07 ASSESSMENT — ENCOUNTER SYMPTOMS
FATIGUE: 0
HEADACHES: 0
DYSURIA: 0
WHEEZING: 0
CONSTIPATION: 0
NAUSEA: 0
CHILLS: 0
ARTHRALGIAS: 1
BACK PAIN: 0
COUGH: 0
DIARRHEA: 0
FEVER: 0
PALPITATIONS: 0
ABDOMINAL PAIN: 0
HEMATURIA: 0
SHORTNESS OF BREATH: 0

## 2024-02-15 ENCOUNTER — HOSPITAL ENCOUNTER (OUTPATIENT)
Dept: RADIOLOGY | Facility: HOSPITAL | Age: 49
Discharge: HOME | End: 2024-02-15
Payer: COMMERCIAL

## 2024-02-15 DIAGNOSIS — M25.512 ACUTE PAIN OF LEFT SHOULDER: ICD-10-CM

## 2024-02-15 DIAGNOSIS — M75.102 ROTATOR CUFF SYNDROME OF LEFT SHOULDER: ICD-10-CM

## 2024-02-15 PROCEDURE — 73222 MRI JOINT UPR EXTREM W/DYE: CPT | Mod: LT

## 2024-02-15 PROCEDURE — 73040 CONTRAST X-RAY OF SHOULDER: CPT | Mod: LT

## 2024-02-15 PROCEDURE — 2550000001 HC RX 255 CONTRASTS: Performed by: INTERNAL MEDICINE

## 2024-02-15 PROCEDURE — 2500000005 HC RX 250 GENERAL PHARMACY W/O HCPCS: Performed by: INTERNAL MEDICINE

## 2024-02-15 PROCEDURE — 77002 NEEDLE LOCALIZATION BY XRAY: CPT | Mod: LEFT SIDE | Performed by: RADIOLOGY

## 2024-02-15 PROCEDURE — 73222 MRI JOINT UPR EXTREM W/DYE: CPT | Mod: LEFT SIDE | Performed by: RADIOLOGY

## 2024-02-15 PROCEDURE — 23350 INJECTION FOR SHOULDER X-RAY: CPT | Mod: LEFT SIDE | Performed by: RADIOLOGY

## 2024-02-15 PROCEDURE — A9575 INJ GADOTERATE MEGLUMI 0.1ML: HCPCS | Performed by: INTERNAL MEDICINE

## 2024-02-15 RX ORDER — LIDOCAINE HYDROCHLORIDE 10 MG/ML
10 INJECTION, SOLUTION EPIDURAL; INFILTRATION; INTRACAUDAL; PERINEURAL
Status: COMPLETED | OUTPATIENT
Start: 2024-02-15 | End: 2024-02-15

## 2024-02-15 RX ORDER — GADOTERATE MEGLUMINE 376.9 MG/ML
0.1 INJECTION INTRAVENOUS
Status: COMPLETED | OUTPATIENT
Start: 2024-02-15 | End: 2024-02-15

## 2024-02-15 RX ADMIN — GADOTERATE MEGLUMINE 0.1 ML: 376.9 INJECTION INTRAVENOUS at 14:55

## 2024-02-15 RX ADMIN — IOHEXOL 5 ML: 350 INJECTION, SOLUTION INTRAVENOUS at 14:55

## 2024-02-15 RX ADMIN — LIDOCAINE HYDROCHLORIDE 100 MG: 10 INJECTION, SOLUTION EPIDURAL; INFILTRATION; INTRACAUDAL; PERINEURAL at 14:55

## 2024-02-16 DIAGNOSIS — M75.102 TEAR OF LEFT ROTATOR CUFF, UNSPECIFIED TEAR EXTENT, UNSPECIFIED WHETHER TRAUMATIC: Primary | ICD-10-CM

## 2024-02-26 ENCOUNTER — E-VISIT (OUTPATIENT)
Dept: PRIMARY CARE | Facility: CLINIC | Age: 49
End: 2024-02-26
Payer: COMMERCIAL

## 2024-02-26 ENCOUNTER — APPOINTMENT (OUTPATIENT)
Dept: PRIMARY CARE | Facility: CLINIC | Age: 49
End: 2024-02-26
Payer: COMMERCIAL

## 2024-02-26 DIAGNOSIS — J01.90 ACUTE NON-RECURRENT SINUSITIS, UNSPECIFIED LOCATION: Primary | ICD-10-CM

## 2024-02-26 PROCEDURE — 99421 OL DIG E/M SVC 5-10 MIN: CPT | Performed by: NURSE PRACTITIONER

## 2024-02-26 RX ORDER — AMOXICILLIN AND CLAVULANATE POTASSIUM 875; 125 MG/1; MG/1
1 TABLET, FILM COATED ORAL 2 TIMES DAILY
Qty: 20 TABLET | Refills: 0 | Status: SHIPPED | OUTPATIENT
Start: 2024-02-26 | End: 2024-03-14 | Stop reason: ALTCHOICE

## 2024-02-26 ASSESSMENT — LIFESTYLE VARIABLES
HISTORY_OF_SMOKING: I SMOKED IN THE PAST, BUT NOT REGULARLY
HISTORY_OF_SMOKING: I SMOKED IN THE PAST, BUT NOT REGULARLY

## 2024-02-26 NOTE — TELEPHONE ENCOUNTER
Reviewed pt symptoms and feel pt is suffering from sinusitis  Will Rx antibiotic and encourage pt to continue with Flonase as needed and other supportive measures offered.   Allergies: Bactrim

## 2024-03-01 ENCOUNTER — HOSPITAL ENCOUNTER (OUTPATIENT)
Dept: RADIOLOGY | Facility: CLINIC | Age: 49
Discharge: HOME | End: 2024-03-01
Payer: COMMERCIAL

## 2024-03-01 ENCOUNTER — OFFICE VISIT (OUTPATIENT)
Dept: ORTHOPEDIC SURGERY | Facility: CLINIC | Age: 49
End: 2024-03-01
Payer: COMMERCIAL

## 2024-03-01 DIAGNOSIS — M75.102 TEAR OF LEFT ROTATOR CUFF, UNSPECIFIED TEAR EXTENT, UNSPECIFIED WHETHER TRAUMATIC: ICD-10-CM

## 2024-03-01 DIAGNOSIS — S43.003A SUBLUXATION OF TENDON OF LONG HEAD OF BICEPS: ICD-10-CM

## 2024-03-01 DIAGNOSIS — S46.812S FULL THICKNESS TEAR OF LEFT SUBSCAPULARIS TENDON, SEQUELA: Primary | ICD-10-CM

## 2024-03-01 PROBLEM — S46.812A FULL THICKNESS TEAR OF LEFT SUBSCAPULARIS TENDON: Status: ACTIVE | Noted: 2024-03-01

## 2024-03-01 PROCEDURE — 73030 X-RAY EXAM OF SHOULDER: CPT | Mod: LT

## 2024-03-01 PROCEDURE — 73030 X-RAY EXAM OF SHOULDER: CPT | Mod: LEFT SIDE | Performed by: RADIOLOGY

## 2024-03-01 PROCEDURE — 99204 OFFICE O/P NEW MOD 45 MIN: CPT | Performed by: STUDENT IN AN ORGANIZED HEALTH CARE EDUCATION/TRAINING PROGRAM

## 2024-03-01 PROCEDURE — 1036F TOBACCO NON-USER: CPT | Performed by: STUDENT IN AN ORGANIZED HEALTH CARE EDUCATION/TRAINING PROGRAM

## 2024-03-01 ASSESSMENT — PAIN SCALES - GENERAL: PAINLEVEL_OUTOF10: 2

## 2024-03-01 ASSESSMENT — PAIN - FUNCTIONAL ASSESSMENT: PAIN_FUNCTIONAL_ASSESSMENT: 0-10

## 2024-03-01 NOTE — PROGRESS NOTES
Mika Salamanca is a 48 y.o. year-old  male  he is a new patient to our office and presents with a chief complaint of Left shoulder pain.  He notes that he had an injury last fall when he was lifting something heavy and felt pain.  It has been ongoing since then.  He has been doing physical therapy without relief.  Very active johanna jesus works out and has had difficulty with his activities due to the pain in his shoulder.  Pain is primarily anterior does radiate into the biceps lateral as well      Past Medical, Family, and Social History reviewed     Review of Systems  A complete review of systems was conducted, pertinent only to the HPI noted above.    Physical Exam  GEN: Alert and Oriented x 3  Constitutional: Well appearing, in no apparent distress.  Eyes: sclera anicteric  ENT: hearing appropriate for normal conversation, neck appears symmetric with no gross thyromegaly  Pulm: No labored breathing, no wheezing  CVS: Regular rate and rhythm  PSY: normal mood and affect  Skin: No rashes, erythema, or induration around shoulder     Focused Musculoskeletal Exam:     Side: Left shoulder:  PROM:   FE (170)   ER 60 ABER/ABIR: 90/90  AROM:   FE (120)   ER 45 IR T8  Strength:  Supra [5/5] Infra [5/5] Subscap [5/5]  Abd [5/5]  Pain with belly press and lift off  Tenderness to palpation over the biceps tendon        ACJ:  AC TTP: [neg]  Cross Arm [neg]  AC prominence [no]      [Sensation intact Ax/median/ulnar/radial distributions  Motor intact Ax/median/radial/ulnar/AIN/PIN    X-rays and MRI arthrogram of the shoulder independently viewed and interpreted: Full-thickness tear of the upper border of the subscapularis with biceps subluxation and tendinopathy probable full-thickness small tear of the anterior border of the supraspinatus    Patient was prescribed a [abduction sling for [subscapularis tear]. The patient has weakness, instability and/or deformity of their [Left shoulder which requires stabilization from  this orthosis to improve their function.  Verbal and written instructions for the use, wear schedule, cleaning and application of this item were given.  Patient was instructed that should the brace result in increased pain, decreased sensation, increased swelling, or an overall worsening of their medical condition, to please contact our office immediately. Orthotic management and training was provided for skin care, modifications due to healing tissues, edema changes, interruption in skin integrity, and safety precautions with the orthosis.      The patient history, physical examination and imaging studies are consistent with upper border subscap tear with biceps subluxation likely supraspinatus tear. The treatment options including medical management and surgery including rotator cuff repair and biceps tenodesis were discussed at length. I discussed at length the importance of post-operative rehab and the risk for adhesive capsulitis if this therapy is not appropriately attended. The risks and benefits of the surgery including but not limited to bleeding, infection, nerve injury, and potential future surgery were also presented and reviewed. I discussed at length the fact that this surgery is designed to address pain and that recovery of strength is not predictable. All questions were answered. The patient acknowledged the explanation, agreed to proceed with the plan.

## 2024-03-04 ENCOUNTER — TELEPHONE (OUTPATIENT)
Dept: ORTHOPEDIC SURGERY | Facility: HOSPITAL | Age: 49
End: 2024-03-04
Payer: COMMERCIAL

## 2024-03-06 ENCOUNTER — TRANSCRIBE ORDERS (OUTPATIENT)
Dept: OPERATING ROOM | Facility: HOSPITAL | Age: 49
End: 2024-03-06
Payer: COMMERCIAL

## 2024-03-06 DIAGNOSIS — S46.812S STRAIN OF OTHER MUSCLES, FASCIA AND TENDONS AT SHOULDER AND UPPER ARM LEVEL, LEFT ARM, SEQUELA: Primary | ICD-10-CM

## 2024-03-06 DIAGNOSIS — S43.003A UNSPECIFIED SUBLUXATION OF UNSPECIFIED SHOULDER JOINT, INITIAL ENCOUNTER: ICD-10-CM

## 2024-03-14 ENCOUNTER — OFFICE VISIT (OUTPATIENT)
Dept: PRIMARY CARE | Facility: CLINIC | Age: 49
End: 2024-03-14
Payer: COMMERCIAL

## 2024-03-14 VITALS
WEIGHT: 174.5 LBS | TEMPERATURE: 97.5 F | HEART RATE: 63 BPM | OXYGEN SATURATION: 96 % | DIASTOLIC BLOOD PRESSURE: 101 MMHG | SYSTOLIC BLOOD PRESSURE: 172 MMHG | BODY MASS INDEX: 26.45 KG/M2 | HEIGHT: 68 IN

## 2024-03-14 DIAGNOSIS — R43.8 METALLIC TASTE: ICD-10-CM

## 2024-03-14 DIAGNOSIS — I10 WHITE COAT SYNDROME WITH DIAGNOSIS OF HYPERTENSION: ICD-10-CM

## 2024-03-14 DIAGNOSIS — J01.00 ACUTE NON-RECURRENT MAXILLARY SINUSITIS: Primary | ICD-10-CM

## 2024-03-14 PROCEDURE — 1036F TOBACCO NON-USER: CPT | Performed by: INTERNAL MEDICINE

## 2024-03-14 PROCEDURE — 3080F DIAST BP >= 90 MM HG: CPT | Performed by: INTERNAL MEDICINE

## 2024-03-14 PROCEDURE — 3077F SYST BP >= 140 MM HG: CPT | Performed by: INTERNAL MEDICINE

## 2024-03-14 PROCEDURE — 99213 OFFICE O/P EST LOW 20 MIN: CPT | Performed by: INTERNAL MEDICINE

## 2024-03-14 RX ORDER — FLUTICASONE PROPIONATE 50 MCG
SPRAY, SUSPENSION (ML) NASAL
COMMUNITY
Start: 2018-10-17

## 2024-03-14 RX ORDER — DOXYCYCLINE 100 MG/1
100 CAPSULE ORAL 2 TIMES DAILY
Qty: 28 CAPSULE | Refills: 0 | Status: SHIPPED | OUTPATIENT
Start: 2024-03-14 | End: 2024-03-28

## 2024-03-14 RX ORDER — PREDNISONE 10 MG/1
TABLET ORAL
Qty: 30 TABLET | Refills: 0 | Status: SHIPPED | OUTPATIENT
Start: 2024-03-14 | End: 2024-03-24

## 2024-03-14 ASSESSMENT — ENCOUNTER SYMPTOMS
PALPITATIONS: 0
CHILLS: 0
SINUS PRESSURE: 1
SORE THROAT: 0
SHORTNESS OF BREATH: 0
FEVER: 0
FATIGUE: 0
SINUS PAIN: 0
COUGH: 0

## 2024-03-14 ASSESSMENT — PATIENT HEALTH QUESTIONNAIRE - PHQ9
1. LITTLE INTEREST OR PLEASURE IN DOING THINGS: NOT AT ALL
2. FEELING DOWN, DEPRESSED OR HOPELESS: NOT AT ALL
SUM OF ALL RESPONSES TO PHQ9 QUESTIONS 1 AND 2: 0

## 2024-03-14 NOTE — PROGRESS NOTES
CHIEF COMPLAINT  Sinus Problem (1 month congestion post nasal drip )    HISTORY OF PRESENT ILLNESS  Mika Salamanca is a 48 y.o. male presents today for follow up of Sinus Problem (1 month congestion post nasal drip )    HPI    Postnasal drip, congestion, pressure in sinuses.  Metallic taste in mouth.  Had fever at one point, did web visit and was prescribed antibiotic.     Throat is dry.  No sore throat, no ear pain.    Antibiotic was Augmetin x 7 days.  The metallic taste started before the antibiotic.     Using Flonase and nasal irrigation.    BP at home 115-125 / 80-85.    REVIEW OF SYSTEMS  Review of Systems   Constitutional:  Negative for chills, fatigue and fever.   HENT:  Positive for congestion, postnasal drip and sinus pressure. Negative for ear pain, sinus pain and sore throat.    Respiratory:  Negative for cough and shortness of breath.    Cardiovascular:  Negative for chest pain, palpitations and leg swelling.       ALLERGIES  Sulfamethoxazole-trimethoprim    MEDICATIONS  Current Outpatient Medications   Medication Instructions    albuterol 90 mcg/actuation inhaler 1-2 puffs, inhalation, every 4-6 hours as needed for wheezing<BR>    diazePAM (VALIUM) 10 mg, oral, 2 times daily PRN    doxycycline (VIBRAMYCIN) 100 mg, oral, 2 times daily, Take with at least 8 ounces (large glass) of water, do not lie down for 30 minutes after    fluticasone (Flonase) 50 mcg/actuation nasal spray nasal, Daily RT    HYDROcodone-acetaminophen (Norco) 5-325 mg tablet 1 tablet, oral    ibuprofen (Motrin IB) 200 mg tablet     L. acidophilus/Bifid. animalis 32 billion cell capsule Probiotic CAPS   Refills: 0        Start : 4-Apr-2019   Active    montelukast (Singulair) 10 mg tablet 1 tablet, oral, Daily, As directed    multivit-min-FA-lycopen-lutein (ESSENTIAL Man) 0.4-2-250 mg-mg-mcg tablet Multi For Him Oral Tablet   Refills: 0       Active    predniSONE (Deltasone) 10 mg tablet Take 5 tablets (50 mg) by mouth once daily  "for 2 days, THEN 4 tablets (40 mg) once daily for 2 days, THEN 3 tablets (30 mg) once daily for 2 days, THEN 2 tablets (20 mg) once daily for 2 days, THEN 1 tablet (10 mg) once daily for 2 days.       TOBACCO USE  Social History     Tobacco Use   Smoking Status Former    Types: Cigarettes   Smokeless Tobacco Never       DEPRESSION SCREEN  Over the past 2 weeks, how often have you been bothered by any of the following problems?  Little interest or pleasure in doing things: Not at all  Feeling down, depressed, or hopeless: Not at all    SURGICAL HISTORY  Past Surgical History:  07/11/2014: OTHER SURGICAL HISTORY      Comment:  Leg Repair  04/24/2019: OTHER SURGICAL HISTORY      Comment:  Complete colonoscopy       OBJECTIVE    BP (!) 172/101   Pulse 63   Temp 36.4 °C (97.5 °F)   Ht 1.727 m (5' 8\")   Wt 79.2 kg (174 lb 8 oz)   SpO2 96%   BMI 26.53 kg/m²    BMI: Estimated body mass index is 26.53 kg/m² as calculated from the following:    Height as of this encounter: 1.727 m (5' 8\").    Weight as of this encounter: 79.2 kg (174 lb 8 oz).    BP Readings from Last 3 Encounters:   03/14/24 (!) 172/101   02/05/24 (!) 160/93   11/06/23 126/79      Wt Readings from Last 3 Encounters:   03/14/24 79.2 kg (174 lb 8 oz)   02/05/24 79.8 kg (176 lb)   11/06/23 78 kg (172 lb)        PHYSICAL EXAM  Physical Exam  Constitutional:       Appearance: Normal appearance.   HENT:      Head: Normocephalic and atraumatic.      Right Ear: Tympanic membrane and ear canal normal.      Left Ear: Tympanic membrane and ear canal normal.      Nose: Congestion present.      Mouth/Throat:      Mouth: Mucous membranes are moist.      Pharynx: Oropharynx is clear. No oropharyngeal exudate.   Cardiovascular:      Rate and Rhythm: Normal rate and regular rhythm.      Heart sounds: No murmur heard.  Pulmonary:      Effort: Pulmonary effort is normal. No respiratory distress.      Breath sounds: Normal breath sounds. No wheezing.   Neurological:      " General: No focal deficit present.      Mental Status: He is alert and oriented to person, place, and time. Mental status is at baseline.   Psychiatric:         Mood and Affect: Mood normal.         Behavior: Behavior normal.          ASSESSMENT AND PLAN  Assessment/Plan   Problem List Items Addressed This Visit    None  Visit Diagnoses       Acute non-recurrent maxillary sinusitis    -  Primary    Relevant Medications    doxycycline (Vibramycin) 100 mg capsule    predniSONE (Deltasone) 10 mg tablet          Sinusitis with nasal congestion, postnasal drip - symptoms x 1 month, with metallic taste in mouth.  No resolution after 1 week course of Augmentin.  Rx sent for doxycycline and prednisone taper.  OK to continue Flonase and nasal saline irrigation.  If not getting better after 2 weeks, then can consider referral to ENT.    White coat syndrome - BP is elevated, checks at home and is in acceptable range.

## 2024-04-15 DIAGNOSIS — J34.89 NASAL OBSTRUCTION: ICD-10-CM

## 2024-04-15 DIAGNOSIS — R09.81 NASAL SINUS CONGESTION: ICD-10-CM

## 2024-04-15 DIAGNOSIS — J34.3 HYPERTROPHY OF INFERIOR NASAL TURBINATE: ICD-10-CM

## 2024-04-15 DIAGNOSIS — J34.2 NASAL SEPTAL DEVIATION: Primary | ICD-10-CM

## 2024-05-06 ENCOUNTER — PRE-ADMISSION TESTING (OUTPATIENT)
Dept: PREADMISSION TESTING | Facility: HOSPITAL | Age: 49
End: 2024-05-06
Payer: COMMERCIAL

## 2024-05-06 ENCOUNTER — LAB (OUTPATIENT)
Dept: LAB | Facility: LAB | Age: 49
End: 2024-05-06
Payer: COMMERCIAL

## 2024-05-06 VITALS
HEART RATE: 70 BPM | TEMPERATURE: 98.5 F | BODY MASS INDEX: 26.03 KG/M2 | RESPIRATION RATE: 14 BRPM | WEIGHT: 171.74 LBS | HEIGHT: 68 IN | OXYGEN SATURATION: 99 %

## 2024-05-06 DIAGNOSIS — S46.812S STRAIN OF OTHER MUSCLES, FASCIA AND TENDONS AT SHOULDER AND UPPER ARM LEVEL, LEFT ARM, SEQUELA: ICD-10-CM

## 2024-05-06 DIAGNOSIS — Z01.818 PREOPERATIVE TESTING: ICD-10-CM

## 2024-05-06 DIAGNOSIS — Z01.818 PREOPERATIVE TESTING: Primary | ICD-10-CM

## 2024-05-06 LAB
ANION GAP SERPL CALC-SCNC: 15 MMOL/L (ref 10–20)
ATRIAL RATE: 65 BPM
BASOPHILS # BLD AUTO: 0.07 X10*3/UL (ref 0–0.1)
BASOPHILS NFR BLD AUTO: 0.9 %
BUN SERPL-MCNC: 16 MG/DL (ref 6–23)
CALCIUM SERPL-MCNC: 10.1 MG/DL (ref 8.6–10.3)
CHLORIDE SERPL-SCNC: 102 MMOL/L (ref 98–107)
CO2 SERPL-SCNC: 25 MMOL/L (ref 21–32)
CREAT SERPL-MCNC: 0.9 MG/DL (ref 0.5–1.3)
EGFRCR SERPLBLD CKD-EPI 2021: >90 ML/MIN/1.73M*2
EOSINOPHIL # BLD AUTO: 0.05 X10*3/UL (ref 0–0.7)
EOSINOPHIL NFR BLD AUTO: 0.6 %
ERYTHROCYTE [DISTWIDTH] IN BLOOD BY AUTOMATED COUNT: 12.2 % (ref 11.5–14.5)
GLUCOSE SERPL-MCNC: 100 MG/DL (ref 74–99)
HCT VFR BLD AUTO: 45.8 % (ref 41–52)
HGB BLD-MCNC: 14.9 G/DL (ref 13.5–17.5)
IMM GRANULOCYTES # BLD AUTO: 0.02 X10*3/UL (ref 0–0.7)
IMM GRANULOCYTES NFR BLD AUTO: 0.3 % (ref 0–0.9)
LYMPHOCYTES # BLD AUTO: 1.5 X10*3/UL (ref 1.2–4.8)
LYMPHOCYTES NFR BLD AUTO: 18.9 %
MCH RBC QN AUTO: 28.1 PG (ref 26–34)
MCHC RBC AUTO-ENTMCNC: 32.5 G/DL (ref 32–36)
MCV RBC AUTO: 86 FL (ref 80–100)
MONOCYTES # BLD AUTO: 0.76 X10*3/UL (ref 0.1–1)
MONOCYTES NFR BLD AUTO: 9.6 %
NEUTROPHILS # BLD AUTO: 5.52 X10*3/UL (ref 1.2–7.7)
NEUTROPHILS NFR BLD AUTO: 69.7 %
NRBC BLD-RTO: NORMAL /100{WBCS}
P AXIS: 48 DEGREES
P OFFSET: 204 MS
P ONSET: 148 MS
PLATELET # BLD AUTO: 275 X10*3/UL (ref 150–450)
POTASSIUM SERPL-SCNC: 4.1 MMOL/L (ref 3.5–5.3)
PR INTERVAL: 144 MS
Q ONSET: 220 MS
QRS COUNT: 11 BEATS
QRS DURATION: 94 MS
QT INTERVAL: 378 MS
QTC CALCULATION(BAZETT): 393 MS
QTC FREDERICIA: 388 MS
R AXIS: 2 DEGREES
RBC # BLD AUTO: 5.31 X10*6/UL (ref 4.5–5.9)
SODIUM SERPL-SCNC: 138 MMOL/L (ref 136–145)
T AXIS: 25 DEGREES
T OFFSET: 409 MS
VENTRICULAR RATE: 65 BPM
WBC # BLD AUTO: 7.9 X10*3/UL (ref 4.4–11.3)

## 2024-05-06 PROCEDURE — 99203 OFFICE O/P NEW LOW 30 MIN: CPT | Performed by: NURSE PRACTITIONER

## 2024-05-06 PROCEDURE — 36415 COLL VENOUS BLD VENIPUNCTURE: CPT

## 2024-05-06 PROCEDURE — 93005 ELECTROCARDIOGRAM TRACING: CPT

## 2024-05-06 PROCEDURE — 80048 BASIC METABOLIC PNL TOTAL CA: CPT

## 2024-05-06 PROCEDURE — 85025 COMPLETE CBC W/AUTO DIFF WBC: CPT

## 2024-05-06 ASSESSMENT — ENCOUNTER SYMPTOMS
EYES NEGATIVE: 1
CARDIOVASCULAR NEGATIVE: 1
HEMATOLOGIC/LYMPHATIC NEGATIVE: 1
ENDOCRINE NEGATIVE: 1
PSYCHIATRIC NEGATIVE: 1
ALLERGIC/IMMUNOLOGIC NEGATIVE: 1
GASTROINTESTINAL NEGATIVE: 1
CONSTITUTIONAL NEGATIVE: 1

## 2024-05-06 NOTE — H&P (VIEW-ONLY)
CPM/PAT Evaluation   Mika Salamanca is a 48 y.o. male   Chief Complaint: I am having shoulder surgery    HPI:  Patient is a 47 y/o alert and oriented male coming in for PAT for a scheduled Repair Arthroscopic Rotator Cuff Left Shoulder, Biceps Tenodesis on 5/20/2024 w/ Dr. Olea.    The patient reports he has constant dull achy shoulder pain.  Rates it a 2/10.  He states lifting and reaching causes sharp pain.  He states nothing really helps the discomfort.  He reports he can only lift his left arm to 90 degrees.  Denies any numbness, tingling or weakness in his arm.    Patient denies chest pain, SOB, [place] and [place].    Patient also denies Hx: DVT/PE.    Current medications were reviewed and a presurgical mediation schedule was provided.    He has no questions at this time.   Past Medical History:   Diagnosis Date    Abdominal distension (gaseous) 03/21/2022    Abdominal bloating    Abnormal findings on diagnostic imaging of other abdominal regions, including retroperitoneum 04/04/2019    Abnormal CT of the abdomen    COVID-19 08/01/2022    COVID-19 virus infection    Generalized abdominal pain 03/15/2019    Generalized abdominal pain    Lumbago with sciatica, right side 03/09/2017    Acute right-sided low back pain with right-sided sciatica    Metatarsalgia, left foot 07/11/2014    Metatarsalgia of left foot    Myalgia, other site 04/14/2021    Gluteal pain    Other conditions influencing health status 02/22/2018    History of cough    Other general symptoms and signs 03/19/2019    Abnormal finding    Pain in right knee 03/09/2017    Acute pain of right knee    Pelvic and perineal pain 03/15/2019    Pelvic pain in male    Personal history of other diseases of male genital organs 08/08/2018    History of prostatitis    Personal history of other diseases of the digestive system 04/04/2019    History of ileus    Personal history of other diseases of the musculoskeletal system and connective tissue 03/15/2019     History of back pain    Personal history of other diseases of the respiratory system 09/20/2019    History of acute sinusitis    Personal history of other diseases of the respiratory system     History of asthma    Personal history of other diseases of the respiratory system     History of bronchitis    Personal history of other diseases of the respiratory system 04/20/2015    History of acute sinusitis    Personal history of other specified conditions 04/04/2019    History of abdominal pain    Personal history of other specified conditions 09/16/2019    History of fatigue    Right upper quadrant pain 02/10/2022    RUQ pain    Strain of muscle, fascia and tendon of the posterior muscle group at thigh level, left thigh, subsequent encounter 06/16/2021    Hamstring strain, left, subsequent encounter    Strain of muscle, fascia and tendon of the posterior muscle group at thigh level, right thigh, subsequent encounter 05/12/2021    Partial hamstring tear, right, subsequent encounter    Strain of muscle, fascia and tendon of the posterior muscle group at thigh level, unspecified thigh, initial encounter 04/13/2021    Hamstring tear    Strain of muscle, fascia and tendon of the posterior muscle group at thigh level, unspecified thigh, initial encounter 05/26/2021    Hamstring tear    Strain of other muscles, fascia and tendons at shoulder and upper arm level, right arm, initial encounter 01/21/2016    Strain of right trapezius muscle    Strain of unspecified muscle, fascia and tendon at shoulder and upper arm level, right arm, initial encounter 07/11/2014    Right shoulder strain      Past Surgical History:   Procedure Laterality Date    OTHER SURGICAL HISTORY  07/11/2014    Leg Repair    OTHER SURGICAL HISTORY  04/24/2019    Complete colonoscopy        Allergies   Allergen Reactions    Sulfamethoxazole-Trimethoprim Unknown        Current Outpatient Medications on File Prior to Visit   Medication Sig Dispense Refill     albuterol 90 mcg/actuation inhaler Inhale 1-2 puffs. every 4-6 hours as needed for wheezing      diazePAM (Valium) 10 mg tablet Take 1 tablet (10 mg) by mouth 2 times a day as needed for anxiety (vertigo). 30 tablet 1    fluticasone (Flonase) 50 mcg/actuation nasal spray Administer into affected nostril(s) once daily.      HYDROcodone-acetaminophen (Norco) 5-325 mg tablet Take 1 tablet by mouth.      ibuprofen (Motrin IB) 200 mg tablet       L. acidophilus/Bifid. animalis 32 billion cell capsule Probiotic CAPS   Refills: 0        Start : 4-Apr-2019   Active      montelukast (Singulair) 10 mg tablet Take 1 tablet (10 mg) by mouth once daily. As directed      multivit-min-FA-lycopen-lutein (ESSENTIAL Man) 0.4-2-250 mg-mg-mcg tablet Multi For Him Oral Tablet   Refills: 0       Active       No current facility-administered medications on file prior to visit.       Vitals:    05/06/24 1226   Pulse: 70   Resp: 14   Temp: 36.9 °C (98.5 °F)   SpO2: 99%   /101 - known white coat syndrome    Review of Systems   Constitutional: Negative.    HENT:          BPPV - stable   Eyes: Negative.    Respiratory:          Asthma     Cardiovascular: Negative.         White coat syndrome   Gastrointestinal: Negative.    Endocrine: Negative.    Genitourinary: Negative.    Musculoskeletal:         Shoulder pain  See hpi for details  DJD cervical spine  Cervical Radiculopathy  Lumbar Spondylosis   Skin: Negative.    Allergic/Immunologic: Negative.    Neurological:         Cervical radiculopathy     Hematological: Negative.    Psychiatric/Behavioral: Negative.        Physical Exam  Vitals and nursing note reviewed.   Constitutional:       Appearance: Normal appearance.   HENT:      Head: Normocephalic and atraumatic.      Mouth/Throat:      Mouth: Mucous membranes are moist.      Pharynx: Oropharynx is clear.   Eyes:      Pupils: Pupils are equal, round, and reactive to light.   Cardiovascular:      Rate and Rhythm: Normal rate and  regular rhythm.      Heart sounds: Normal heart sounds.   Pulmonary:      Effort: Pulmonary effort is normal.      Breath sounds: Normal breath sounds.   Abdominal:      General: Abdomen is flat. Bowel sounds are normal.      Palpations: Abdomen is soft.   Musculoskeletal:      Cervical back: Normal range of motion.      Comments: Only able to raise left arm to 90 degrees   Skin:     General: Skin is warm and dry.   Neurological:      General: No focal deficit present.      Mental Status: He is alert and oriented to person, place, and time.   Psychiatric:         Mood and Affect: Mood normal.         Behavior: Behavior normal.         Thought Content: Thought content normal.         Judgment: Judgment normal.        PAT AIRWAY:   Airway:     Mallampati::  III    TM distance::  >3 FB    Neck ROM::  Full    Assessment and Plan:     Neuro:    No neurologic diagnoses.  The patient is at an increased risk for post operative delirium secondary Preoperative brain exercise educational handout provided to patient.    The patient is not at an increased risk for perioperative stroke     HEENT/Airway:    Occastional BPPV  Currently asymptomatic    Cardiovascular:    No additional preoperative testing is currently indicated.    ASA II  EKG  NSR rate of 65 w/ possible right atrial enlargement  METS are  RCRI  0 which is 3.9% % 30 day risk of MACE (risk for cardiac death, nonfatal myocardial infarction, and nonfactal cardiac arrest  IOANA score which indicates a  0.1 % risk of intraoperative or 30-day postoperative MACE      Pulmonary:    Asthma  Managed with albuterol inhaler prn  Managed with montelukast 10mg daily  Stable no recent flares    Preoperative deep breathing educational handout provided to patient.    ARISCAT: 0  points which is a low (1.6%) risk of in-hospital post-op pulmonary complications   PRODIGY:   8 points which is a intermediate risk of post op opioid induced respiratory depression episodes  STOP BAN  points which is a low risk for moderate to severe DONALD    Renal:  No diagnosis or significant findings on chart review or clinical presentation and evaluation, however, the patient is at increased risk of perioperative renal complications secondary to male sex. Preventative measures include    Pt at Low risk for perioperative YAKELIN based on Dynamic Predictive Scoring Tool for Perioperative YAKELIN  BMP ordered today    Endocrine:  No diagnosis or significant findings on chart review or clinical presentation and evaluation.     Hematologic:  No diagnosis or significant findings on chart review or clinical presentation and evaluation.  Preoperative DVT educational handout provided to patient.    Caprini Score: 8   points which is a moderate risk of perioperative VTE    Gastrointestinal:   No diagnosis or significant findings on chart review or clinical presentation and evaluation.    EAT-10 score of  0 - self-perceived oropharyngeal dysphagia scale (0-40)   Apfel: 1 points 21%% risk for post operative N/V    Infectious disease:  negative or type diagnoses    Musculoskeletal:    Strain of other muscles, fascia and Tendons at Shoulder and Upper Arm Level - Left Arm, Unspecified Subluxation of Shoulder Joint  Managed with Norco 1 prn     Other:     NSQUIP    overall surgical risk  Modified Frailty  JHFRAT-        falls risk    Anesthesia:  PONV    no dental issues  Does not smoke  Occasional alcohol use  Occasional edibles   personal issues with Anesthesia - has post op nausea and vomiting  No family issues with anesthesia    No nickel, shell fish, or iodine allergies    Discussed with patient medication instructions, NPO guidelines, and any questions or concerns. Patient does not need further workup prior to preceding with elective surgery based on based on risk assessment.     Face to Face patient contact time 30 minutes    DENTON Barahona 5/6/2024 12:04 PM      Labs ordered  cbc, BMP, EKG

## 2024-05-06 NOTE — PREPROCEDURE INSTRUCTIONS
Medication List            Accurate as of May 6, 2024 12:42 PM. Always use your most recent med list.                albuterol 90 mcg/actuation inhaler  Notes to patient: Take as needed     diazePAM 10 mg tablet  Commonly known as: Valium  Take 1 tablet (10 mg) by mouth 2 times a day as needed for anxiety (vertigo).  Notes to patient: Take as needed     ESSENTIAL Man 0.4-2-250 mg-mg-mcg tablet  Generic drug: multivitamin-minerals-folic acid-lycopen-lutein  Medication Adjustments for Surgery: Stop 7 days before surgery  Notes to patient: Last dose on 5/13/2024     fluticasone 50 mcg/actuation nasal spray  Commonly known as: Flonase  Notes to patient: Use as needed     HYDROcodone-acetaminophen 5-325 mg tablet  Commonly known as: Norco  Notes to patient: Take as needed     L. acidophilus/Bifid. animalis 32 billion cell capsule  Medication Adjustments for Surgery: Stop 7 days before surgery  Notes to patient: Last dose 5/13/2024     montelukast 10 mg tablet  Commonly known as: Singulair  Medication Adjustments for Surgery: Take morning of surgery with sip of water, no other fluids     Motrin  mg tablet  Generic drug: ibuprofen  Medication Adjustments for Surgery: Stop 7 days before surgery  Notes to patient: Last dose 5/13/2024              NPO Instructions:    Do not eat any food after midnight the night before your surgery/procedure.  You may have clear liquids until TWO hours before surgery/procedure. This includes water, black tea/coffee, (no milk or cream) apple juice and electrolyte drinks (Gatorade).  You may chew gum up to TWO hours before your surgery/procedure.    Additional Instructions:     Seven/Six Days before Surgery:  Review your medication instructions, stop indicated medications  Five Days before Surgery:  Review your medication instructions, stop indicated medications  Three Days before Surgery:  Review your medication instructions, stop indicated medications  The Day before Surgery:  Review  your medication instructions, stop indicated medications  You will be contacted regarding the time of your arrival to facility and surgery time  Do not eat any food after Midnight  Day of Surgery:  Review your medication instructions, take indicated medications  You may have clear liquids until TWO hours before surgery/procedure.  This includes water, black tea/coffee, (no milk or cream) apple juice and electrolyte drinks (Gatorade)  You may chew gum up to TWO hours before your surgery/procedure  Wear  comfortable loose fitting clothing  Do not use moisturizers, creams, lotions or perfume  All jewelry and valuables should be left at home  CONTACT SURGEON'S OFFICE IF YOU DEVELOP:  * Fever = 100.4 F   * New respiratory symptoms (e.g. cough, shortness of breath, respiratory distress, sore throat)  * Recent loss of taste or smell  *Flu like symptoms such as headache, fatigue or gastrointestinal symptoms  * You develop any open sores, shingles, burning or painful urination   AND/OR:  * You no longer wish to have the surgery.  * Any other personal circumstances change that may lead to the need to cancel or defer this surgery.  *You were admitted to any hospital within one week of your planned procedure.    SMOKING:  *Quitting smoking can make a huge difference to your health and recovery from surgery.    *If you need help with quitting, call 0-621-QUIT-NOW.    THE DAY BEFORE SURGERY:  *Do not eat any food after midnight the night before your surgery.   *You may have up to 13.5 OUNCES of clear liquids until TWO hours before your instructed ARRIVAL TIME to hospital. This includes water, black tea/coffee, (no milk or cream) apple juice, clear broth and electrolyte drinks (Gatorade). Please avoid clear liquids that are red in color.   *You may chew gum/mints up to TWO hours before your surgery/procedure.    SURGICAL TIME:  *You will be contacted between 2 p.m. and 3 p.m. the business day before your surgery with your arrival  time.  *If you haven't received a call by 3pm, call [phone]  *Scheduled surgery times may change and you will be notified if this occurs-check your personal voicemail for any updates.    ON THE MORNING OF SURGERY:  *Wear comfortable, loose fitting clothing.   *Do not use moisturizers, creams, lotions or perfume.  *All jewelry and valuables should be left at home.  *Prosthetic devices such as contact lenses, hearing aids, dentures, eyelash extensions, hairpins and body piercing must be removed before surgery.    BRING WITH YOU:  *Photo ID and insurance card  *Current list of medications and allergies  *Pacemaker/Defibrillator/Heart stent cards  *CPAP machine and mask  *Slings/splints/crutches  *Copy of your complete Advanced Directive/DHPOA-if applicable  *Neurostimulator implant remote    PARKING AND ARRIVAL:  *Check in at the Main Entrance desk and let them know you are here for surgery.    IF YOU ARE HAVING OUTPATIENT/SAME DAY SURGERY:  *A responsible adult MUST accompany you at the time of discharge and stay with you for 24 hours after your surgery.  *You may NOT drive yourself home after surgery.  *You may use a taxi or ride sharing service ([employer], [employer]) to return home ONLY if you are accompanied by a friend or family member.  *Instructions for resuming your medications will be provided by your surgeon.    Thank you for coming to Pre Admission testing.     If I have prescribe medication please don't forget to  at your pharmacy.     Any questions about today's visit call [phone] and leave a message in the general mailbox.    Patient instructed to ambulate as soon as possible postoperatively to decrease thromboembolic risk.    Viviane Freeman, APRN-CNP    Thank you for visiting [facility].  If you have any changes to your health condition, please call the surgeons office to alert them and give them details of your symptoms.        Preoperative Fasting Guidelines    Why  must I stop eating and drinking near surgery time?  With sedation, food or liquid in your stomach can enter your lungs causing serious complications  Increases nausea and vomiting    When do I need to stop eating and drinking before my surgery?  Do not eat any food after midnight the night before your surgery/procedure.  You may have up to 13.5 ounces of clear liquid until TWO hours before your instructed arrival time to the hospital.  This includes water, black tea/coffee, (no milk or cream) apple juice, and electrolyte drinks (Gatorade)  You may chew gum until TWO hours before your surgery/procedure      Additional Instructions:     The Day before Surgery:  -Review your medication instructions, stop indicated medications  -You will be contacted in the evening regarding the time of your arrival to facility and surgery time    Day of Surgery:  -Review your medication instructions, take indicated medications  -Wear comfortable loose fitting clothing  -Do not use moisturizers, creams, lotions or perfume  -All jewelry and valuables should be left at home              Preoperative Brain Exercises    What are brain exercises?  A brain exercise is any activity that engages your thinking (cognitive) skills.    What types of activities are considered brain exercises?  Jigsaw puzzles, crossword puzzles, word jumble, memory games, word search, and many more.  Many can be found free online or on your phone via a mobile martin.    Why should I do brain exercises before my surgery?  More recent research has shown brain exercise before surgery can lower the risk of postoperative delirium (confusion) which can be especially important for older adults.  Patients who did brain exercises for 5 to 10 hours the days before surgery, cut their risk of postoperative delirium in half up to 1 week after surgery.                  [facility]    Preoperative Deep Breathing Exercises    Why it is important to do deep  breathing exercises before my surgery?  Deep breathing exercises strengthen your breathing muscles.  This helps you to recover after your surgery and decreases the chance of breathing complications.      How are the deep breathing exercises done?  Sit straight with your back supported.  Breathe in deeply and slowly through your nose. Your lower rib cage should expand and your abdomen may move forward.  Hold that breath for 3 to 5 seconds.  Breathe out through pursed lips, slowly and completely.  Rest and repeat 10 times every hour while awake.  Rest longer if you become dizzy or lightheaded.                [facility]    Preoperative Deep Breathing Exercises    Why it is important to do deep breathing exercises before my surgery?  Deep breathing exercises strengthen your breathing muscles.  This helps you to recover after your surgery and decreases the chance of breathing complications.      How are the deep breathing exercises done?  Sit straight with your back supported.  Breathe in deeply and slowly through your nose. Your lower rib cage should expand and your abdomen may move forward.  Hold that breath for 3 to 5 seconds.  Breathe out through pursed lips, slowly and completely.  Rest and repeat 10 times every hour while awake.  Rest longer if you become dizzy or lightheaded.      Patient Information: Incentive Spirometer  What is an incentive spirometer?  An incentive spirometer is a device used before and after surgery to “exercise” your lungs.  It helps you to take deeper breaths to expand your lungs.  Below is an example of a basic incentive spirometer.  The device you receive may differ slightly but they all function the same.    Why do I need to use an incentive spirometer?  Using your incentive spirometer prepares your lungs for surgery and helps prevent lung problems after surgery.  How do I use my incentive spirometer?  When you're using your incentive spirometer, make sure to  breathe through your mouth. If you breathe through your nose, the incentive spirometer won't work properly. You can hold your nose if you have trouble.  If you feel dizzy at any time, stop and rest. Try again at a later time.  Follow the steps below:  Set up your incentive spirometer, expand the flexible tubing and connect to the outlet.  Sit upright in a chair or bed. Hold the incentive spirometer at eye level.   Put the mouthpiece in your mouth and close your lips tightly around it. Slowly breathe out (exhale) completely.  Breathe in (inhale) slowly through your mouth as deeply as you can. As you take a breath, you will see the piston rise inside the large column. While the piston rises, the indicator should move upwards. It should stay in between the 2 arrows (see Figure).  Try to get the piston as high as you can, while keeping the indicator between the arrows.   If the indicator doesn't stay between the arrows, you're breathing either too fast or too slow.  When you get it as high as you can, hold your breath for 10 seconds, or as long as possible. While you're holding your breath, the piston will slowly fall to the base of the spirometer.  Once the piston reaches the bottom of the spirometer, breathe out slowly through your mouth. Rest for a few seconds.  Repeat 10 times. Try to get the piston to the same level with each breath.  Repeat every hour while awake  You can carefully clean the outside of the mouthpiece with an alcohol wipe or soap and water.      Patient and Family Education             Ways You Can Help Prevent Blood Clots             This handout explains some simple things you can do to help prevent blood clots.      Blood clots are blockages that can form in the body's veins. When a blood clot forms in your deep veins, it may be called a deep vein thrombosis, or DVT for short. Blood clots can happen in any part of the body where blood flows, but they are most common in the arms and legs. If a  piece of a blood clot breaks free and travels to the lungs, it is called a pulmonary embolus (PE). A PE can be a very serious problem.         Being in the hospital or having surgery can raise your chances of getting a blood clot because you may not be well enough to move around as much as you normally do.         Ways you can help prevent blood clots in the hospital         Wearing SCDs. SCDs stands for Sequential Compression Devices.   SCDs are special sleeves that wrap around your legs  They attach to a pump that fills them with air to gently squeeze your legs every few minutes.   This helps return the blood in your legs to your heart.   SCDs should only be taken off when walking or bathing.   SCDs may not be comfortable, but they can help save your life.               Wearing compression stockings - if your doctor orders them. These special snug fitting stockings gently squeeze your legs to help blood flow.       Walking. Walking helps move the blood in your legs.   If your doctor says it is ok, try walking the halls at least   5 times a day. Ask us to help you get up, so you don't fall.      Taking any blood thinning medicines your doctor orders.        Page 1 of 2     [facility]; 3/23   Ways you can help prevent blood clots at home       Wearing compression stockings - if your doctor orders them. ? Walking - to help move the blood in your legs.       Taking any blood thinning medicines your doctor orders.      Signs of a blood clot or PE      Tell your doctor or nurse know right away if you have of the problems listed below.    If you are at home, seek medical care right away. Call 911 for chest pain or problems breathing.               Signs of a blood clot (DVT) - such as pain,  swelling, redness or warmth in your arm or leg      Signs of a pulmonary embolism (PE) - such as chest     pain or feeling short of breath

## 2024-05-06 NOTE — CPM/PAT H&P
CPM/PAT Evaluation   Mika Salamanca is a 48 y.o. male   Chief Complaint: I am having shoulder surgery    HPI:  Patient is a 49 y/o alert and oriented male coming in for PAT for a scheduled Repair Arthroscopic Rotator Cuff Left Shoulder, Biceps Tenodesis on 5/20/2024 w/ Dr. Olea.    The patient reports he has constant dull achy shoulder pain.  Rates it a 2/10.  He states lifting and reaching causes sharp pain.  He states nothing really helps the discomfort.  He reports he can only lift his left arm to 90 degrees.  Denies any numbness, tingling or weakness in his arm.    Patient denies chest pain, SOB, [place] and [place].    Patient also denies Hx: DVT/PE.    Current medications were reviewed and a presurgical mediation schedule was provided.    He has no questions at this time.   Past Medical History:   Diagnosis Date    Abdominal distension (gaseous) 03/21/2022    Abdominal bloating    Abnormal findings on diagnostic imaging of other abdominal regions, including retroperitoneum 04/04/2019    Abnormal CT of the abdomen    COVID-19 08/01/2022    COVID-19 virus infection    Generalized abdominal pain 03/15/2019    Generalized abdominal pain    Lumbago with sciatica, right side 03/09/2017    Acute right-sided low back pain with right-sided sciatica    Metatarsalgia, left foot 07/11/2014    Metatarsalgia of left foot    Myalgia, other site 04/14/2021    Gluteal pain    Other conditions influencing health status 02/22/2018    History of cough    Other general symptoms and signs 03/19/2019    Abnormal finding    Pain in right knee 03/09/2017    Acute pain of right knee    Pelvic and perineal pain 03/15/2019    Pelvic pain in male    Personal history of other diseases of male genital organs 08/08/2018    History of prostatitis    Personal history of other diseases of the digestive system 04/04/2019    History of ileus    Personal history of other diseases of the musculoskeletal system and connective tissue 03/15/2019     History of back pain    Personal history of other diseases of the respiratory system 09/20/2019    History of acute sinusitis    Personal history of other diseases of the respiratory system     History of asthma    Personal history of other diseases of the respiratory system     History of bronchitis    Personal history of other diseases of the respiratory system 04/20/2015    History of acute sinusitis    Personal history of other specified conditions 04/04/2019    History of abdominal pain    Personal history of other specified conditions 09/16/2019    History of fatigue    Right upper quadrant pain 02/10/2022    RUQ pain    Strain of muscle, fascia and tendon of the posterior muscle group at thigh level, left thigh, subsequent encounter 06/16/2021    Hamstring strain, left, subsequent encounter    Strain of muscle, fascia and tendon of the posterior muscle group at thigh level, right thigh, subsequent encounter 05/12/2021    Partial hamstring tear, right, subsequent encounter    Strain of muscle, fascia and tendon of the posterior muscle group at thigh level, unspecified thigh, initial encounter 04/13/2021    Hamstring tear    Strain of muscle, fascia and tendon of the posterior muscle group at thigh level, unspecified thigh, initial encounter 05/26/2021    Hamstring tear    Strain of other muscles, fascia and tendons at shoulder and upper arm level, right arm, initial encounter 01/21/2016    Strain of right trapezius muscle    Strain of unspecified muscle, fascia and tendon at shoulder and upper arm level, right arm, initial encounter 07/11/2014    Right shoulder strain      Past Surgical History:   Procedure Laterality Date    OTHER SURGICAL HISTORY  07/11/2014    Leg Repair    OTHER SURGICAL HISTORY  04/24/2019    Complete colonoscopy        Allergies   Allergen Reactions    Sulfamethoxazole-Trimethoprim Unknown        Current Outpatient Medications on File Prior to Visit   Medication Sig Dispense Refill     albuterol 90 mcg/actuation inhaler Inhale 1-2 puffs. every 4-6 hours as needed for wheezing      diazePAM (Valium) 10 mg tablet Take 1 tablet (10 mg) by mouth 2 times a day as needed for anxiety (vertigo). 30 tablet 1    fluticasone (Flonase) 50 mcg/actuation nasal spray Administer into affected nostril(s) once daily.      HYDROcodone-acetaminophen (Norco) 5-325 mg tablet Take 1 tablet by mouth.      ibuprofen (Motrin IB) 200 mg tablet       L. acidophilus/Bifid. animalis 32 billion cell capsule Probiotic CAPS   Refills: 0        Start : 4-Apr-2019   Active      montelukast (Singulair) 10 mg tablet Take 1 tablet (10 mg) by mouth once daily. As directed      multivit-min-FA-lycopen-lutein (ESSENTIAL Man) 0.4-2-250 mg-mg-mcg tablet Multi For Him Oral Tablet   Refills: 0       Active       No current facility-administered medications on file prior to visit.       Vitals:    05/06/24 1226   Pulse: 70   Resp: 14   Temp: 36.9 °C (98.5 °F)   SpO2: 99%   /101 - known white coat syndrome    Review of Systems   Constitutional: Negative.    HENT:          BPPV - stable   Eyes: Negative.    Respiratory:          Asthma     Cardiovascular: Negative.         White coat syndrome   Gastrointestinal: Negative.    Endocrine: Negative.    Genitourinary: Negative.    Musculoskeletal:         Shoulder pain  See hpi for details  DJD cervical spine  Cervical Radiculopathy  Lumbar Spondylosis   Skin: Negative.    Allergic/Immunologic: Negative.    Neurological:         Cervical radiculopathy     Hematological: Negative.    Psychiatric/Behavioral: Negative.        Physical Exam  Vitals and nursing note reviewed.   Constitutional:       Appearance: Normal appearance.   HENT:      Head: Normocephalic and atraumatic.      Mouth/Throat:      Mouth: Mucous membranes are moist.      Pharynx: Oropharynx is clear.   Eyes:      Pupils: Pupils are equal, round, and reactive to light.   Cardiovascular:      Rate and Rhythm: Normal rate and  regular rhythm.      Heart sounds: Normal heart sounds.   Pulmonary:      Effort: Pulmonary effort is normal.      Breath sounds: Normal breath sounds.   Abdominal:      General: Abdomen is flat. Bowel sounds are normal.      Palpations: Abdomen is soft.   Musculoskeletal:      Cervical back: Normal range of motion.      Comments: Only able to raise left arm to 90 degrees   Skin:     General: Skin is warm and dry.   Neurological:      General: No focal deficit present.      Mental Status: He is alert and oriented to person, place, and time.   Psychiatric:         Mood and Affect: Mood normal.         Behavior: Behavior normal.         Thought Content: Thought content normal.         Judgment: Judgment normal.        PAT AIRWAY:   Airway:     Mallampati::  III    TM distance::  >3 FB    Neck ROM::  Full    Assessment and Plan:     Neuro:    No neurologic diagnoses.  The patient is at an increased risk for post operative delirium secondary Preoperative brain exercise educational handout provided to patient.    The patient is not at an increased risk for perioperative stroke     HEENT/Airway:    Occastional BPPV  Currently asymptomatic    Cardiovascular:    No additional preoperative testing is currently indicated.    ASA II  EKG  NSR rate of 65 w/ possible right atrial enlargement  METS are  RCRI  0 which is 3.9% % 30 day risk of MACE (risk for cardiac death, nonfatal myocardial infarction, and nonfactal cardiac arrest  IOANA score which indicates a  0.1 % risk of intraoperative or 30-day postoperative MACE      Pulmonary:    Asthma  Managed with albuterol inhaler prn  Managed with montelukast 10mg daily  Stable no recent flares    Preoperative deep breathing educational handout provided to patient.    ARISCAT: 0  points which is a low (1.6%) risk of in-hospital post-op pulmonary complications   PRODIGY:   8 points which is a intermediate risk of post op opioid induced respiratory depression episodes  STOP BAN  points which is a low risk for moderate to severe DONALD    Renal:  No diagnosis or significant findings on chart review or clinical presentation and evaluation, however, the patient is at increased risk of perioperative renal complications secondary to male sex. Preventative measures include    Pt at Low risk for perioperative YAKELIN based on Dynamic Predictive Scoring Tool for Perioperative YAKELIN  BMP ordered today    Endocrine:  No diagnosis or significant findings on chart review or clinical presentation and evaluation.     Hematologic:  No diagnosis or significant findings on chart review or clinical presentation and evaluation.  Preoperative DVT educational handout provided to patient.    Caprini Score: 8   points which is a moderate risk of perioperative VTE    Gastrointestinal:   No diagnosis or significant findings on chart review or clinical presentation and evaluation.    EAT-10 score of  0 - self-perceived oropharyngeal dysphagia scale (0-40)   Apfel: 1 points 21%% risk for post operative N/V    Infectious disease:  negative or type diagnoses    Musculoskeletal:    Strain of other muscles, fascia and Tendons at Shoulder and Upper Arm Level - Left Arm, Unspecified Subluxation of Shoulder Joint  Managed with Norco 1 prn     Other:     NSQUIP    overall surgical risk  Modified Frailty  JHFRAT-        falls risk    Anesthesia:  PONV    no dental issues  Does not smoke  Occasional alcohol use  Occasional edibles   personal issues with Anesthesia - has post op nausea and vomiting  No family issues with anesthesia    No nickel, shell fish, or iodine allergies    Discussed with patient medication instructions, NPO guidelines, and any questions or concerns. Patient does not need further workup prior to preceding with elective surgery based on based on risk assessment.     Face to Face patient contact time 30 minutes    DENTON Barahona 5/6/2024 12:04 PM      Labs ordered  cbc, BMP, EKG

## 2024-05-08 ENCOUNTER — OFFICE VISIT (OUTPATIENT)
Dept: PRIMARY CARE | Facility: CLINIC | Age: 49
End: 2024-05-08
Payer: COMMERCIAL

## 2024-05-08 VITALS
OXYGEN SATURATION: 97 % | BODY MASS INDEX: 26.04 KG/M2 | HEIGHT: 68 IN | DIASTOLIC BLOOD PRESSURE: 100 MMHG | WEIGHT: 171.8 LBS | SYSTOLIC BLOOD PRESSURE: 138 MMHG | HEART RATE: 69 BPM | TEMPERATURE: 97 F

## 2024-05-08 DIAGNOSIS — H81.10 BENIGN PAROXYSMAL POSITIONAL VERTIGO, UNSPECIFIED LATERALITY: Primary | ICD-10-CM

## 2024-05-08 DIAGNOSIS — Z79.899 CHRONIC PRESCRIPTION BENZODIAZEPINE USE: ICD-10-CM

## 2024-05-08 DIAGNOSIS — R03.0 WHITE COAT SYNDROME WITHOUT DIAGNOSIS OF HYPERTENSION: ICD-10-CM

## 2024-05-08 PROCEDURE — 3080F DIAST BP >= 90 MM HG: CPT | Performed by: INTERNAL MEDICINE

## 2024-05-08 PROCEDURE — 3075F SYST BP GE 130 - 139MM HG: CPT | Performed by: INTERNAL MEDICINE

## 2024-05-08 PROCEDURE — 1036F TOBACCO NON-USER: CPT | Performed by: INTERNAL MEDICINE

## 2024-05-08 PROCEDURE — 99213 OFFICE O/P EST LOW 20 MIN: CPT | Performed by: INTERNAL MEDICINE

## 2024-05-08 ASSESSMENT — ENCOUNTER SYMPTOMS
FATIGUE: 0
SORE THROAT: 0
SINUS PAIN: 0
CHILLS: 0
COUGH: 0
PALPITATIONS: 0
FEVER: 0
SHORTNESS OF BREATH: 0
ARTHRALGIAS: 1

## 2024-05-08 NOTE — PROGRESS NOTES
CHIEF COMPLAINT  Vertigo    HISTORY OF PRESENT ILLNESS  Mika Salamanca is a 48 y.o. male presents today for follow up of Vertigo    HPI    History reviewed and updated.  Scheduled for left shoulder rotator cuff repair later this month.  Does not need refill on diazepam today - uses PRN for vertigo.  Checks BP at home - usually in normal range.  No recent issues with asthma, doing well on current regimen.    OARRS:  Yaquelin Roldan MD on 5/8/2024  4:13 PM  I have personally reviewed the OARRS report for Mika Salamanca. I have considered the risks of abuse, dependence, addiction and diversion and I believe that it is clinically appropriate for Mika Salamanca to be prescribed this medication    Is the patient prescribed a combination of a benzodiazepine and opioid?  Yes, I feel it is clincially indicated to continue the medication and have discussed with the patient risks/benefits/alternatives.    Last Urine Drug Screen / ordered today: No  Recent Results (from the past 8760 hour(s))   OPIATE/OPIOID/BENZO PRESCRIPTION COMPLIANCE    Collection Time: 08/09/23  8:34 AM   Result Value Ref Range    DRUG SCREEN COMMENT URINE SEE BELOW     Creatine, Urine 179.9 mg/dL    Amphetamine Screen, Urine PRESUMPTIVE NEGATIVE NEGATIVE    Barbiturate Screen, Urine PRESUMPTIVE NEGATIVE NEGATIVE    Cannabinoid Screen, Urine PRESUMPTIVE NEGATIVE NEGATIVE    Cocaine Screen, Urine PRESUMPTIVE NEGATIVE NEGATIVE    PCP Screen, Urine PRESUMPTIVE NEGATIVE NEGATIVE    7-Aminoclonazepam <25 Cutoff <25 ng/mL    Alpha-Hydroxyalprazolam <25 Cutoff <25 ng/mL    Alpha-Hydroxymidazolam <25 Cutoff <25 ng/mL    Alprazolam <25 Cutoff <25 ng/mL    Chlordiazepoxide <25 Cutoff <25 ng/mL    Clonazepam <25 Cutoff <25 ng/mL    Diazepam <25 Cutoff <25 ng/mL    Lorazepam <25 Cutoff <25 ng/mL    Midazolam <25 Cutoff <25 ng/mL    Nordiazepam 325 (A) Cutoff <25 ng/mL    Oxazepam 455 (A) Cutoff <25 ng/mL    Temazepam 851 (A) Cutoff <25 ng/mL     Zolpidem <25 Cutoff <25 ng/mL    Zolpidem Metabolite (ZCA) <25 Cutoff <25 ng/mL    6-Acetylmorphine <25 Cutoff <25 ng/mL    Codeine <50 Cutoff <50 ng/mL    Hydrocodone <25 Cutoff <25 ng/mL    Hydromorphone <25 Cutoff <25 ng/mL    Morphine Urine <50 Cutoff <50 ng/mL    Norhydrocodone <25 Cutoff <25 ng/mL    Noroxycodone <25 Cutoff <25 ng/mL    Oxycodone <25 Cutoff <25 ng/mL    Oxymorphone <25 Cutoff <25 ng/mL    Tramadol <50 Cutoff <50 ng/mL    O-Desmethyltramadol <50 Cutoff <50 ng/mL    Fentanyl <2.5 Cutoff<2.5 ng/mL    Norfentanyl <2.5 Cutoff<2.5 ng/mL    METHADONE CONFIRMATION,URINE <25 Cutoff <25 ng/mL    EDDP <25 Cutoff <25 ng/mL     Results are as expected.         Controlled Substance Agreement:  Date of the Last Agreement: 2/5/24  Reviewed Controlled Substance Agreement including but not limited to the benefits, risks, and alternatives to treatment with a Controlled Substance medication(s).    Benzodiazepines:  What is the patient's goal of therapy? Relief of vertigo  Is this being achieved with current treatment? yes    Activities of Daily Living:   Is your overall impression that this patient is benefiting (symptom reduction outweighs side effects) from benzodiazepine therapy? Yes     1. Physical Functioning: Same  2. Family Relationship: Same  3. Social Relationship: Same  4. Mood: Same  5. Sleep Patterns: Same  6. Overall Function: Better      REVIEW OF SYSTEMS  Review of Systems   Constitutional:  Negative for chills, fatigue and fever.   HENT:  Positive for congestion. Negative for ear pain, sinus pain and sore throat.    Respiratory:  Negative for cough and shortness of breath.    Cardiovascular:  Negative for chest pain, palpitations and leg swelling.   Musculoskeletal:  Positive for arthralgias.   Neurological:         Intermittent vertigo       ALLERGIES  Sulfamethoxazole-trimethoprim    MEDICATIONS  Current Outpatient Medications   Medication Instructions    albuterol 90 mcg/actuation inhaler 1-2  "puffs, inhalation, every 4-6 hours as needed for wheezing<BR>    diazePAM (VALIUM) 10 mg, oral, 2 times daily PRN    fluticasone (Flonase) 50 mcg/actuation nasal spray nasal, Daily RT    HYDROcodone-acetaminophen (Norco) 5-325 mg tablet 1 tablet, oral    ibuprofen (Motrin IB) 200 mg tablet Take 3 tablets (600 mg) by mouth every 6 hours if needed.    L. acidophilus/Bifid. animalis 32 billion cell capsule Take 1 capsule by mouth once daily.    montelukast (Singulair) 10 mg tablet 1 tablet, oral, Daily, As directed    multivit-min-FA-lycopen-lutein (ESSENTIAL Man) 0.4-2-250 mg-mg-mcg tablet Multi For Him Oral Tablet   Refills: 0       Active       TOBACCO USE  Social History     Tobacco Use   Smoking Status Former    Types: Cigarettes   Smokeless Tobacco Never       DEPRESSION SCREEN  Over the past 2 weeks, how often have you been bothered by any of the following problems?  Little interest or pleasure in doing things: Not at all  Feeling down, depressed, or hopeless: Not at all    SURGICAL HISTORY  Past Surgical History:  07/11/2014: OTHER SURGICAL HISTORY      Comment:  Leg Repair  04/24/2019: OTHER SURGICAL HISTORY      Comment:  Complete colonoscopy       OBJECTIVE    BP (!) 138/100   Pulse 69   Temp 36.1 °C (97 °F)   Ht 1.727 m (5' 8\")   Wt 77.9 kg (171 lb 12.8 oz)   SpO2 97%   BMI 26.12 kg/m²    BMI: Estimated body mass index is 26.12 kg/m² as calculated from the following:    Height as of this encounter: 1.727 m (5' 8\").    Weight as of this encounter: 77.9 kg (171 lb 12.8 oz).    BP Readings from Last 3 Encounters:   05/08/24 (!) 138/100   03/14/24 (!) 172/101   02/05/24 (!) 160/93      Wt Readings from Last 3 Encounters:   05/08/24 77.9 kg (171 lb 12.8 oz)   05/06/24 77.9 kg (171 lb 11.8 oz)   03/14/24 79.2 kg (174 lb 8 oz)        PHYSICAL EXAM  Physical Exam  Constitutional:       Appearance: Normal appearance.   HENT:      Head: Normocephalic and atraumatic.   Cardiovascular:      Rate and Rhythm: " Normal rate and regular rhythm.   Pulmonary:      Effort: Pulmonary effort is normal. No respiratory distress.      Breath sounds: Normal breath sounds. No wheezing.   Abdominal:      General: There is no distension.      Palpations: Abdomen is soft.      Tenderness: There is no abdominal tenderness.   Musculoskeletal:      Right lower leg: No edema.      Left lower leg: No edema.   Skin:     Findings: No rash.   Neurological:      General: No focal deficit present.      Mental Status: He is alert and oriented to person, place, and time. Mental status is at baseline.   Psychiatric:         Mood and Affect: Mood normal.         Behavior: Behavior normal.         Thought Content: Thought content normal.         Judgment: Judgment normal.          ASSESSMENT AND PLAN  Assessment/Plan   Problem List Items Addressed This Visit       BPPV (benign paroxysmal positional vertigo) - Primary    White coat syndrome without diagnosis of hypertension    Chronic prescription benzodiazepine use     BPPV - has been on diazepam PRN for many years good good results.  CSA was updated 2/5/24.  OARRS reviewed.   UDS 8/9/23.  Does not need refill today.  Continue follow-up every 90 days.     Asthma - no active issues, uses albuterol PRN.     Scheduled for left shoulder surgery.  Anticipated that he will receive Rx for opiate pain medication for post-operative pain.  He understands that he should never use diazepam with opiate pain medication due to the risk of respiratory depression.

## 2024-05-16 ENCOUNTER — OFFICE VISIT (OUTPATIENT)
Dept: OTOLARYNGOLOGY | Facility: CLINIC | Age: 49
End: 2024-05-16
Payer: COMMERCIAL

## 2024-05-16 VITALS
DIASTOLIC BLOOD PRESSURE: 77 MMHG | HEIGHT: 68 IN | TEMPERATURE: 97.7 F | WEIGHT: 171 LBS | SYSTOLIC BLOOD PRESSURE: 131 MMHG | BODY MASS INDEX: 25.91 KG/M2

## 2024-05-16 DIAGNOSIS — J34.3 HYPERTROPHY OF BOTH INFERIOR NASAL TURBINATES: ICD-10-CM

## 2024-05-16 DIAGNOSIS — J34.2 DEVIATED NASAL SEPTUM: ICD-10-CM

## 2024-05-16 PROCEDURE — 99204 OFFICE O/P NEW MOD 45 MIN: CPT | Performed by: OTOLARYNGOLOGY

## 2024-05-16 PROCEDURE — 3075F SYST BP GE 130 - 139MM HG: CPT | Performed by: OTOLARYNGOLOGY

## 2024-05-16 PROCEDURE — 1036F TOBACCO NON-USER: CPT | Performed by: OTOLARYNGOLOGY

## 2024-05-16 PROCEDURE — 31231 NASAL ENDOSCOPY DX: CPT | Performed by: OTOLARYNGOLOGY

## 2024-05-16 PROCEDURE — 3078F DIAST BP <80 MM HG: CPT | Performed by: OTOLARYNGOLOGY

## 2024-05-16 NOTE — PROGRESS NOTES
Impression:  1. Deviated nasal septum  Referral to ENT      2. Hypertrophy of both inferior nasal turbinates  Referral to ENT           RECOMMENDATIONS/PLAN :  The various risks and benefits, complications and alternatives and expected course of recovery were explained to the patient and he would like to pursue a nasal septoplasty with bilateral inferior turbinate Coblation/resection.  We will wait until he is healed up after his upcoming shoulder surgery.  He will call towards the end of June, 1 July and we will sign a consent form and schedule surgery after that.  In the meantime he will continue to rinse his nose using saline and use his Flonase nasal spray as directed.      **This electronic medical record note was created with the use of voice recognition software.  Despite proofreading, typographical or grammatical errors may be present that could affect meaning of content **    Subjective   Patient ID:     Mika Salamanca is a 48 y.o. male who presents to the office today complaining of persistent nasal congestion with difficulty breathing through his nose, worse on the right side.  He has had previous trauma to his nose and he has tried various steroid nasal sprays and saline rinses without significant improvement.  He denies repetitive sinus infections or any fever or chills.  He states that his right side is always blocked.    ROS:  A detailed 12 system review of systems is noted on the intake form has been reviewed with the patient with details noted in the HPI and scanned into the patient's medical record.    Objective     Past Medical History:   Diagnosis Date    Abdominal distension (gaseous) 03/21/2022    Abdominal bloating    Abnormal findings on diagnostic imaging of other abdominal regions, including retroperitoneum 04/04/2019    Abnormal CT of the abdomen    COVID-19 08/01/2022    COVID-19 virus infection    Generalized abdominal pain 03/15/2019    Generalized abdominal pain    Lumbago with  sciatica, right side 03/09/2017    Acute right-sided low back pain with right-sided sciatica    Metatarsalgia, left foot 07/11/2014    Metatarsalgia of left foot    Myalgia, other site 04/14/2021    Gluteal pain    Other conditions influencing health status 02/22/2018    History of cough    Other general symptoms and signs 03/19/2019    Abnormal finding    Pain in right knee 03/09/2017    Acute pain of right knee    Pelvic and perineal pain 03/15/2019    Pelvic pain in male    Personal history of other diseases of male genital organs 08/08/2018    History of prostatitis    Personal history of other diseases of the digestive system 04/04/2019    History of ileus    Personal history of other diseases of the musculoskeletal system and connective tissue 03/15/2019    History of back pain    Personal history of other diseases of the respiratory system 09/20/2019    History of acute sinusitis    Personal history of other diseases of the respiratory system     History of asthma    Personal history of other diseases of the respiratory system     History of bronchitis    Personal history of other diseases of the respiratory system 04/20/2015    History of acute sinusitis    Personal history of other specified conditions 04/04/2019    History of abdominal pain    Personal history of other specified conditions 09/16/2019    History of fatigue    Right upper quadrant pain 02/10/2022    RUQ pain    Strain of muscle, fascia and tendon of the posterior muscle group at thigh level, left thigh, subsequent encounter 06/16/2021    Hamstring strain, left, subsequent encounter    Strain of muscle, fascia and tendon of the posterior muscle group at thigh level, right thigh, subsequent encounter 05/12/2021    Partial hamstring tear, right, subsequent encounter    Strain of muscle, fascia and tendon of the posterior muscle group at thigh level, unspecified thigh, initial encounter 04/13/2021    Hamstring tear    Strain of muscle, fascia  and tendon of the posterior muscle group at thigh level, unspecified thigh, initial encounter 05/26/2021    Hamstring tear    Strain of other muscles, fascia and tendons at shoulder and upper arm level, right arm, initial encounter 01/21/2016    Strain of right trapezius muscle    Strain of unspecified muscle, fascia and tendon at shoulder and upper arm level, right arm, initial encounter 07/11/2014    Right shoulder strain        Past Surgical History:   Procedure Laterality Date    OTHER SURGICAL HISTORY  07/11/2014    Leg Repair    OTHER SURGICAL HISTORY  04/24/2019    Complete colonoscopy        Allergies   Allergen Reactions    Sulfamethoxazole-Trimethoprim Unknown          Current Outpatient Medications:     fluticasone (Flonase) 50 mcg/actuation nasal spray, Administer into affected nostril(s) once daily., Disp: , Rfl:     L. acidophilus/Bifid. animalis 32 billion cell capsule, Take 1 capsule by mouth once daily., Disp: , Rfl:     montelukast (Singulair) 10 mg tablet, Take 1 tablet (10 mg) by mouth once daily. As directed, Disp: , Rfl:     multivit-min-FA-lycopen-lutein (ESSENTIAL Man) 0.4-2-250 mg-mg-mcg tablet, Multi For Him Oral Tablet  Refills: 0     Active, Disp: , Rfl:     albuterol 90 mcg/actuation inhaler, Inhale 1-2 puffs. every 4-6 hours as needed for wheezing, Disp: , Rfl:     diazePAM (Valium) 10 mg tablet, Take 1 tablet (10 mg) by mouth 2 times a day as needed for anxiety (vertigo)., Disp: 30 tablet, Rfl: 1    HYDROcodone-acetaminophen (Norco) 5-325 mg tablet, Take 1 tablet by mouth., Disp: , Rfl:     ibuprofen (Motrin IB) 200 mg tablet, Take 3 tablets (600 mg) by mouth every 6 hours if needed., Disp: , Rfl:      Tobacco Use: Medium Risk (5/16/2024)    Patient History     Smoking Tobacco Use: Former     Smokeless Tobacco Use: Never     Passive Exposure: Not on file        Alcohol Use: Not on file        Social History     Substance and Sexual Activity   Drug Use Yes    Comment: edibles on  "occasion        Physical Exam:  Visit Vitals  /77   Temp 36.5 °C (97.7 °F) (Temporal)   Ht 1.727 m (5' 8\")   Wt 77.6 kg (171 lb)   BMI 26.00 kg/m²   Smoking Status Former   BSA 1.93 m²      General: Patient is alert, oriented, cooperative in no apparent distress.  Head: Normocephalic, atraumatic.  Eyes: PERRL, EOMI, Conjunctiva is clear. No nystagmus.  Ears: Right Ear-- Pinna is normal.  External auditory canal is patent. Tympanic membrane is [intact, translucent and has good mobility with my pneumatic otoscope. No effusion].  Mastoid is nontender.  Left ear-- Pinna is normal.  External auditory canal is patent. Tympanic membrane is [intact, translucent and has good mobility with my pneumatic otoscope.  No effusion].  Mastoid is nontender.  Nose: Septum is severely deviated to the right pushing on the right lateral nasal wall.  There is a fracture line down the middle of the septum from previous trauma..  No septal perforation or lesions. No septal hematoma/ seroma.  No signs of bleeding.  Inferior turbinates are moderately swollen and obstructive, left greater than right.   No evidence of intranasal polyps.  No infectious drainage.  Throat:  Floor of mouth is clear, no masses.  Tongue appears normal, no lesions or masses. Gums, gingiva, buccal mucosa appear pink and moist, no lesions. Teeth are in good repair.  No obvious dental infections.  Peritonsillar regions appear symmetric without swelling.  Hard and soft palate appear normal, no obvious cleft. Uvula is midline.  Oropharynx: No lesions. Retropharyngeal wall is flat.  No active postnasal drip.  Neck: Supple,  no lymphadenopathy.  No masses.  Salivary Glands: Symmetric bilaterally.  No palpable masses.  No evidence of acute infection or salivary stones  Neurologic: Cranial Nerves 2-12 are grossly intact without focal deficits. Cerebellar function testing is normal.     Results:   []    Procedure:   Preoperative diagnosis: Chronic nasal congestion-rule " out intranasal polyps  Postoperative diagnosis: same  Procedure: Rigid nasal endoscopy  Surgeon: Georges Wilson DO  Assist: None  Anesthesia: 4% topical lidocaine mixed with 0.5% Afrin nasal spray 1:1  EBL: Minimal  Complications: None  Disposition: The patient tolerated the procedure well and there were no complications.  The patient was discharged home in stable condition.    Procedure:  After informed consent was obtained with the various risks, benefits, complications, and alternatives explained to the patient/ guardian, the patient was sat up in the ENT chair and  both nostrils were sprayed down with topical lidocaine 4% and .05% Afrin solution.   After allowing this to take effect, the Zero degree rigid endoscope was advanced into both nostrils. The following areas were visualized:    Bilateral nasal passages, nasal septum, nasal turbinates, ostiomeatal complexes and sinus ostia, nasopharynx and eustachian tube orifices. All structures were found to be normal except as follows:    Septum is severely deviated to the right pushing on the right lateral nasal wall from previous trauma.  Inferior turbinates are moderately swollen left greater than right.  Ostiomeatal complexes are clear bilaterally.  No pus polyps or lesions.  Nasopharynx is clear.  No masses.    The patient tolerated the procedure well and there were no complications.    Georges Wilson DO, FAOCO    Georges Wilson DO

## 2024-05-20 ENCOUNTER — ANESTHESIA EVENT (OUTPATIENT)
Dept: OPERATING ROOM | Facility: HOSPITAL | Age: 49
End: 2024-05-20
Payer: COMMERCIAL

## 2024-05-20 ENCOUNTER — ANESTHESIA (OUTPATIENT)
Dept: OPERATING ROOM | Facility: HOSPITAL | Age: 49
End: 2024-05-20
Payer: COMMERCIAL

## 2024-05-20 ENCOUNTER — HOSPITAL ENCOUNTER (OUTPATIENT)
Facility: HOSPITAL | Age: 49
Setting detail: OUTPATIENT SURGERY
Discharge: HOME | End: 2024-05-20
Attending: STUDENT IN AN ORGANIZED HEALTH CARE EDUCATION/TRAINING PROGRAM | Admitting: STUDENT IN AN ORGANIZED HEALTH CARE EDUCATION/TRAINING PROGRAM
Payer: COMMERCIAL

## 2024-05-20 VITALS
BODY MASS INDEX: 25.99 KG/M2 | SYSTOLIC BLOOD PRESSURE: 132 MMHG | TEMPERATURE: 96.8 F | HEIGHT: 68 IN | HEART RATE: 61 BPM | WEIGHT: 171.52 LBS | RESPIRATION RATE: 15 BRPM | OXYGEN SATURATION: 95 % | DIASTOLIC BLOOD PRESSURE: 86 MMHG

## 2024-05-20 DIAGNOSIS — M79.609 POSTOPERATIVE PAIN OF EXTREMITY: Primary | ICD-10-CM

## 2024-05-20 DIAGNOSIS — Z48.89 AFTERCARE FOLLOWING SURGERY: ICD-10-CM

## 2024-05-20 DIAGNOSIS — G89.18 POSTOPERATIVE PAIN OF EXTREMITY: Primary | ICD-10-CM

## 2024-05-20 PROCEDURE — 23430 REPAIR BICEPS TENDON: CPT

## 2024-05-20 PROCEDURE — 7100000009 HC PHASE TWO TIME - INITIAL BASE CHARGE: Performed by: STUDENT IN AN ORGANIZED HEALTH CARE EDUCATION/TRAINING PROGRAM

## 2024-05-20 PROCEDURE — 23430 REPAIR BICEPS TENDON: CPT | Performed by: STUDENT IN AN ORGANIZED HEALTH CARE EDUCATION/TRAINING PROGRAM

## 2024-05-20 PROCEDURE — 3700000001 HC GENERAL ANESTHESIA TIME - INITIAL BASE CHARGE: Performed by: STUDENT IN AN ORGANIZED HEALTH CARE EDUCATION/TRAINING PROGRAM

## 2024-05-20 PROCEDURE — 3600000009 HC OR TIME - EACH INCREMENTAL 1 MINUTE - PROCEDURE LEVEL FOUR: Performed by: STUDENT IN AN ORGANIZED HEALTH CARE EDUCATION/TRAINING PROGRAM

## 2024-05-20 PROCEDURE — 2500000004 HC RX 250 GENERAL PHARMACY W/ HCPCS (ALT 636 FOR OP/ED): Performed by: ANESTHESIOLOGIST ASSISTANT

## 2024-05-20 PROCEDURE — A4217 STERILE WATER/SALINE, 500 ML: HCPCS | Performed by: STUDENT IN AN ORGANIZED HEALTH CARE EDUCATION/TRAINING PROGRAM

## 2024-05-20 PROCEDURE — A29823 PR SHLDR ARTHROSCOP,EXTEN DEBRIDE: Performed by: STUDENT IN AN ORGANIZED HEALTH CARE EDUCATION/TRAINING PROGRAM

## 2024-05-20 PROCEDURE — 2500000004 HC RX 250 GENERAL PHARMACY W/ HCPCS (ALT 636 FOR OP/ED): Performed by: STUDENT IN AN ORGANIZED HEALTH CARE EDUCATION/TRAINING PROGRAM

## 2024-05-20 PROCEDURE — 29823 SHO ARTHRS SRG XTNSV DBRDMT: CPT | Performed by: STUDENT IN AN ORGANIZED HEALTH CARE EDUCATION/TRAINING PROGRAM

## 2024-05-20 PROCEDURE — 2780000003 HC OR 278 NO HCPCS: Performed by: STUDENT IN AN ORGANIZED HEALTH CARE EDUCATION/TRAINING PROGRAM

## 2024-05-20 PROCEDURE — 2500000005 HC RX 250 GENERAL PHARMACY W/O HCPCS: Performed by: STUDENT IN AN ORGANIZED HEALTH CARE EDUCATION/TRAINING PROGRAM

## 2024-05-20 PROCEDURE — A29823 PR SHLDR ARTHROSCOP,EXTEN DEBRIDE: Performed by: ANESTHESIOLOGIST ASSISTANT

## 2024-05-20 PROCEDURE — 29823 SHO ARTHRS SRG XTNSV DBRDMT: CPT

## 2024-05-20 PROCEDURE — 7100000010 HC PHASE TWO TIME - EACH INCREMENTAL 1 MINUTE: Performed by: STUDENT IN AN ORGANIZED HEALTH CARE EDUCATION/TRAINING PROGRAM

## 2024-05-20 PROCEDURE — 2720000007 HC OR 272 NO HCPCS: Performed by: STUDENT IN AN ORGANIZED HEALTH CARE EDUCATION/TRAINING PROGRAM

## 2024-05-20 PROCEDURE — 3700000002 HC GENERAL ANESTHESIA TIME - EACH INCREMENTAL 1 MINUTE: Performed by: STUDENT IN AN ORGANIZED HEALTH CARE EDUCATION/TRAINING PROGRAM

## 2024-05-20 PROCEDURE — 7100000001 HC RECOVERY ROOM TIME - INITIAL BASE CHARGE: Performed by: STUDENT IN AN ORGANIZED HEALTH CARE EDUCATION/TRAINING PROGRAM

## 2024-05-20 PROCEDURE — 7100000002 HC RECOVERY ROOM TIME - EACH INCREMENTAL 1 MINUTE: Performed by: STUDENT IN AN ORGANIZED HEALTH CARE EDUCATION/TRAINING PROGRAM

## 2024-05-20 PROCEDURE — C1713 ANCHOR/SCREW BN/BN,TIS/BN: HCPCS | Performed by: STUDENT IN AN ORGANIZED HEALTH CARE EDUCATION/TRAINING PROGRAM

## 2024-05-20 PROCEDURE — 2500000004 HC RX 250 GENERAL PHARMACY W/ HCPCS (ALT 636 FOR OP/ED): Mod: JZ | Performed by: STUDENT IN AN ORGANIZED HEALTH CARE EDUCATION/TRAINING PROGRAM

## 2024-05-20 PROCEDURE — 3600000004 HC OR TIME - INITIAL BASE CHARGE - PROCEDURE LEVEL FOUR: Performed by: STUDENT IN AN ORGANIZED HEALTH CARE EDUCATION/TRAINING PROGRAM

## 2024-05-20 PROCEDURE — A4649 SURGICAL SUPPLIES: HCPCS | Performed by: STUDENT IN AN ORGANIZED HEALTH CARE EDUCATION/TRAINING PROGRAM

## 2024-05-20 DEVICE — SELF BUNCHING KL 1.8 FIBERTAK, SHOULDER
Type: IMPLANTABLE DEVICE | Site: SHOULDER | Status: FUNCTIONAL
Brand: ARTHREX®

## 2024-05-20 RX ORDER — LIDOCAINE HYDROCHLORIDE AND EPINEPHRINE 20; 10 MG/ML; UG/ML
INJECTION, SOLUTION INFILTRATION; PERINEURAL AS NEEDED
Status: DISCONTINUED | OUTPATIENT
Start: 2024-05-20 | End: 2024-05-20 | Stop reason: HOSPADM

## 2024-05-20 RX ORDER — FENTANYL CITRATE 50 UG/ML
50 INJECTION, SOLUTION INTRAMUSCULAR; INTRAVENOUS EVERY 5 MIN PRN
Status: DISCONTINUED | OUTPATIENT
Start: 2024-05-20 | End: 2024-05-20 | Stop reason: HOSPADM

## 2024-05-20 RX ORDER — OMEPRAZOLE 20 MG/1
20 CAPSULE, DELAYED RELEASE ORAL
Qty: 5 CAPSULE | Refills: 0 | Status: SHIPPED | OUTPATIENT
Start: 2024-05-20 | End: 2024-05-25

## 2024-05-20 RX ORDER — FENTANYL CITRATE 50 UG/ML
50 INJECTION, SOLUTION INTRAMUSCULAR; INTRAVENOUS ONCE
Status: CANCELLED | OUTPATIENT
Start: 2024-05-20 | End: 2024-05-20

## 2024-05-20 RX ORDER — ASPIRIN 81 MG/1
81 TABLET ORAL EVERY 12 HOURS
Qty: 28 TABLET | Refills: 0 | Status: SHIPPED | OUTPATIENT
Start: 2024-05-20 | End: 2024-06-03

## 2024-05-20 RX ORDER — MIDAZOLAM HYDROCHLORIDE 1 MG/ML
2 INJECTION, SOLUTION INTRAMUSCULAR; INTRAVENOUS ONCE
Status: COMPLETED | OUTPATIENT
Start: 2024-05-20 | End: 2024-05-20

## 2024-05-20 RX ORDER — FENTANYL CITRATE 50 UG/ML
INJECTION, SOLUTION INTRAMUSCULAR; INTRAVENOUS AS NEEDED
Status: DISCONTINUED | OUTPATIENT
Start: 2024-05-20 | End: 2024-05-20

## 2024-05-20 RX ORDER — FENTANYL CITRATE 50 UG/ML
100 INJECTION, SOLUTION INTRAMUSCULAR; INTRAVENOUS ONCE
Status: DISCONTINUED | OUTPATIENT
Start: 2024-05-20 | End: 2024-05-20

## 2024-05-20 RX ORDER — KETOROLAC TROMETHAMINE 10 MG/1
10 TABLET, FILM COATED ORAL EVERY 8 HOURS PRN
Qty: 9 TABLET | Refills: 0 | Status: SHIPPED | OUTPATIENT
Start: 2024-05-20 | End: 2024-05-23

## 2024-05-20 RX ORDER — DOCUSATE SODIUM 100 MG/1
100 CAPSULE, LIQUID FILLED ORAL 2 TIMES DAILY
Qty: 20 CAPSULE | Refills: 0 | Status: SHIPPED | OUTPATIENT
Start: 2024-05-20 | End: 2024-05-30

## 2024-05-20 RX ORDER — ONDANSETRON HYDROCHLORIDE 2 MG/ML
INJECTION, SOLUTION INTRAVENOUS AS NEEDED
Status: DISCONTINUED | OUTPATIENT
Start: 2024-05-20 | End: 2024-05-20

## 2024-05-20 RX ORDER — ALBUTEROL SULFATE 0.83 MG/ML
2.5 SOLUTION RESPIRATORY (INHALATION) EVERY 30 MIN PRN
Status: DISCONTINUED | OUTPATIENT
Start: 2024-05-20 | End: 2024-05-20 | Stop reason: HOSPADM

## 2024-05-20 RX ORDER — ONDANSETRON HYDROCHLORIDE 2 MG/ML
4 INJECTION, SOLUTION INTRAVENOUS ONCE AS NEEDED
Status: DISCONTINUED | OUTPATIENT
Start: 2024-05-20 | End: 2024-05-20 | Stop reason: HOSPADM

## 2024-05-20 RX ORDER — SCOLOPAMINE TRANSDERMAL SYSTEM 1 MG/1
1 PATCH, EXTENDED RELEASE TRANSDERMAL ONCE
Status: DISCONTINUED | OUTPATIENT
Start: 2024-05-20 | End: 2024-05-20 | Stop reason: HOSPADM

## 2024-05-20 RX ORDER — SODIUM CHLORIDE, SODIUM LACTATE, POTASSIUM CHLORIDE, CALCIUM CHLORIDE 600; 310; 30; 20 MG/100ML; MG/100ML; MG/100ML; MG/100ML
40 INJECTION, SOLUTION INTRAVENOUS CONTINUOUS
Status: DISCONTINUED | OUTPATIENT
Start: 2024-05-20 | End: 2024-05-20 | Stop reason: HOSPADM

## 2024-05-20 RX ORDER — OXYCODONE HYDROCHLORIDE 5 MG/1
5 TABLET ORAL EVERY 6 HOURS PRN
Qty: 12 TABLET | Refills: 0 | Status: SHIPPED | OUTPATIENT
Start: 2024-05-20 | End: 2024-05-23

## 2024-05-20 RX ORDER — PROPOFOL 10 MG/ML
INJECTION, EMULSION INTRAVENOUS AS NEEDED
Status: DISCONTINUED | OUTPATIENT
Start: 2024-05-20 | End: 2024-05-20

## 2024-05-20 RX ORDER — CEFAZOLIN SODIUM 2 G/100ML
2 INJECTION, SOLUTION INTRAVENOUS ONCE
Status: COMPLETED | OUTPATIENT
Start: 2024-05-20 | End: 2024-05-20

## 2024-05-20 RX ORDER — IPRATROPIUM BROMIDE 0.5 MG/2.5ML
500 SOLUTION RESPIRATORY (INHALATION) EVERY 30 MIN PRN
Status: DISCONTINUED | OUTPATIENT
Start: 2024-05-20 | End: 2024-05-20 | Stop reason: HOSPADM

## 2024-05-20 RX ORDER — MEPERIDINE HYDROCHLORIDE 25 MG/ML
12.5 INJECTION INTRAMUSCULAR; INTRAVENOUS; SUBCUTANEOUS EVERY 10 MIN PRN
Status: DISCONTINUED | OUTPATIENT
Start: 2024-05-20 | End: 2024-05-20 | Stop reason: HOSPADM

## 2024-05-20 RX ORDER — LABETALOL HYDROCHLORIDE 5 MG/ML
5 INJECTION, SOLUTION INTRAVENOUS EVERY 5 MIN PRN
Status: DISCONTINUED | OUTPATIENT
Start: 2024-05-20 | End: 2024-05-20 | Stop reason: HOSPADM

## 2024-05-20 RX ORDER — SODIUM CHLORIDE, SODIUM LACTATE, POTASSIUM CHLORIDE, CALCIUM CHLORIDE 600; 310; 30; 20 MG/100ML; MG/100ML; MG/100ML; MG/100ML
100 INJECTION, SOLUTION INTRAVENOUS CONTINUOUS
Status: DISCONTINUED | OUTPATIENT
Start: 2024-05-20 | End: 2024-05-20 | Stop reason: HOSPADM

## 2024-05-20 RX ADMIN — SODIUM CHLORIDE, SODIUM LACTATE, POTASSIUM CHLORIDE, AND CALCIUM CHLORIDE 100 ML/HR: 600; 310; 30; 20 INJECTION, SOLUTION INTRAVENOUS at 10:31

## 2024-05-20 RX ADMIN — PROPOFOL 150 MG: 10 INJECTION, EMULSION INTRAVENOUS at 11:51

## 2024-05-20 RX ADMIN — DEXAMETHASONE SODIUM PHOSPHATE 4 MG: 4 INJECTION, SOLUTION INTRAMUSCULAR; INTRAVENOUS at 12:48

## 2024-05-20 RX ADMIN — SCOPALAMINE 1 PATCH: 1 PATCH, EXTENDED RELEASE TRANSDERMAL at 10:37

## 2024-05-20 RX ADMIN — ONDANSETRON 4 MG: 2 INJECTION INTRAMUSCULAR; INTRAVENOUS at 12:48

## 2024-05-20 RX ADMIN — PROPOFOL 200 MG: 10 INJECTION, EMULSION INTRAVENOUS at 11:43

## 2024-05-20 RX ADMIN — FENTANYL CITRATE 25 MCG: 0.05 INJECTION, SOLUTION INTRAMUSCULAR; INTRAVENOUS at 12:48

## 2024-05-20 RX ADMIN — MIDAZOLAM 2 MG: 1 INJECTION INTRAMUSCULAR; INTRAVENOUS at 11:01

## 2024-05-20 RX ADMIN — CEFAZOLIN SODIUM 2 G: 2 INJECTION, SOLUTION INTRAVENOUS at 11:45

## 2024-05-20 SDOH — HEALTH STABILITY: MENTAL HEALTH: CURRENT SMOKER: 0

## 2024-05-20 ASSESSMENT — PAIN - FUNCTIONAL ASSESSMENT
PAIN_FUNCTIONAL_ASSESSMENT: 0-10

## 2024-05-20 ASSESSMENT — COLUMBIA-SUICIDE SEVERITY RATING SCALE - C-SSRS
6. HAVE YOU EVER DONE ANYTHING, STARTED TO DO ANYTHING, OR PREPARED TO DO ANYTHING TO END YOUR LIFE?: NO
2. HAVE YOU ACTUALLY HAD ANY THOUGHTS OF KILLING YOURSELF?: NO
1. IN THE PAST MONTH, HAVE YOU WISHED YOU WERE DEAD OR WISHED YOU COULD GO TO SLEEP AND NOT WAKE UP?: NO

## 2024-05-20 ASSESSMENT — PAIN SCALES - GENERAL
PAINLEVEL_OUTOF10: 3
PAINLEVEL_OUTOF10: 0 - NO PAIN
PAINLEVEL_OUTOF10: 3
PAINLEVEL_OUTOF10: 0 - NO PAIN
PAINLEVEL_OUTOF10: 0 - NO PAIN
PAINLEVEL_OUTOF10: 2
PAINLEVEL_OUTOF10: 2
PAINLEVEL_OUTOF10: 0 - NO PAIN

## 2024-05-20 ASSESSMENT — PAIN DESCRIPTION - DESCRIPTORS: DESCRIPTORS: ACHING

## 2024-05-20 NOTE — ANESTHESIA POSTPROCEDURE EVALUATION
Patient: Mika Salamanca    Procedure Summary       Date: 05/20/24 Room / Location: [facility] OR 06 / Virtual [facility]    Anesthesia Start: 1138 Anesthesia Stop: 1300    Procedures:       Repair Arthroscopy Rotator Cuff Shoulder (Left: Shoulder)      DEBREIDMENT AND Biceps Tenodesis ( General plus block anesthesia , Beachchair table ) (Left: Shoulder) Diagnosis:       Strain of other muscles, fascia and tendons at shoulder and upper arm level, left arm, sequela      Unspecified subluxation of unspecified shoulder joint, initial encounter      (Strain of other muscles, fascia and tendons at shoulder and upper arm level, left arm, sequela [S46.812S])      (Unspecified subluxation of unspecified shoulder joint, initial encounter [S43.003A])    Surgeons: Tao Olea MD Responsible Provider: Romeo Bahena DO    Anesthesia Type: general, regional ASA Status: 2            Anesthesia Type: general, regional    Vitals Value Taken Time   /85 05/20/24 1301   Temp 36 05/20/24 1301   Pulse 57 05/20/24 1301   Resp 16 05/20/24 1301   SpO2 96 05/20/24 1301       Anesthesia Post Evaluation    Patient location during evaluation: bedside  Patient participation: complete - patient participated  Level of consciousness: awake and alert  Pain management: adequate  Multimodal analgesia pain management approach  Airway patency: patent  Two or more strategies used to mitigate risk of obstructive sleep apnea  Cardiovascular status: acceptable  Respiratory status: acceptable  Hydration status: acceptable  Postoperative Nausea and Vomiting: none        There were no known notable events for this encounter.

## 2024-05-20 NOTE — BRIEF OP NOTE
Date: 2024  OR Location: [facility]    Name: Mika Salamanca, : 1975, Age: 48 y.o., MRN: [id number], Sex: male    Diagnosis  Pre-op Diagnosis     * Strain of other muscles, fascia and tendons at shoulder and upper arm level, left arm, sequela [S46.812S]     * Unspecified subluxation of unspecified shoulder joint, initial encounter [S43.003A] Post-op Diagnosis     * Strain of other muscles, fascia and tendons at shoulder and upper arm level, left arm, sequela [S46.812S]     * Unspecified subluxation of unspecified shoulder joint, initial encounter [S43.003A]     Procedures  Left shoulder arthroscopic extensive debridement  Left shoulder open subpectoral biceps tenodesis      Surgeons      * Tao Olea - Primary    Resident/Fellow/Other Assistant:  Jennifer Dudley MD, fellow  Kirt Canales MD    Procedure Summary  Anesthesia: General  ASA: II  Anesthesia Staff: Anesthesiologist: Romeo Bahena DO  C-AA: IDALIA Rodney  Estimated Blood Loss: 5 mL  Intra-op Medications:   Administrations occurring from 1100 to 1300 on 24:   Medication Name Total Dose   EPINEPHrine (Adrenalin) 1 mg in sodium chloride 0.9 % 3,000 mL irrigation Cannot be calculated   lidocaine-epinephrine (Xylocaine W/EPI) 2 %-1:100,000 injection 10 mL   lactated Ringer's infusion Cannot be calculated   ceFAZolin in dextrose (iso-os) (Ancef) IVPB 2 g 2 g   midazolam (Versed) injection 2 mg 2 mg              Anesthesia Record               Intraprocedure I/O Totals          Intake    lactated Ringer's infusion 800.00 mL    ceFAZolin in dextrose (iso-os) (Ancef) IVPB 2 g 100.00 mL    Total Intake 900 mL          Specimen: No specimens collected     Staff:   Circulator: Alanna Ferrari RN  Relief Circulator: Sunshine العلي RN  Relief Scrub: Lg Manuel  Scrub Person: Elisabet Mckinnon PA-C; Svetlana Contreras    Findings: See full operative report    Complications:  None; patient tolerated the procedure  well.     Disposition: PACU - hemodynamically stable.  Condition: stable  Specimens Collected: No specimens collected

## 2024-05-20 NOTE — ANESTHESIA PROCEDURE NOTES
Peripheral Block    Patient location during procedure: pre-op  Start time: 5/20/2024 11:04 AM  End time: 5/20/2024 11:06 AM  Reason for block: at surgeon's request and post-op pain management  Staffing  Performed: attending   Authorized by: Romeo Bahena DO    Performed by: Romeo Bahena DO  Preanesthetic Checklist  Completed: patient identified, IV checked, site marked, risks and benefits discussed, surgical consent, monitors and equipment checked, pre-op evaluation and timeout performed   Timeout performed at: 5/20/2024 11:01 AM  Peripheral Block  Patient position: sitting  Prep: ChloraPrep  Patient monitoring: heart rate, cardiac monitor and continuous pulse ox  Block type: interscalene  Laterality: left  Injection technique: single-shot  Guidance: ultrasound guided  Local infiltration: ropivacaine  Infiltration strength: 0.5 %  Dose: 20 mL  Needle  Needle gauge: 22 G  Needle length: 5 cm  Needle localization: ultrasound guidance  Assessment  Injection assessment: negative aspiration for heme, no paresthesia on injection, incremental injection and local visualized surrounding nerve on ultrasound  Paresthesia pain: none  Heart rate change: no  Slow fractionated injection: yes  Additional Notes  With 4mg Decadron

## 2024-05-20 NOTE — ANESTHESIA PROCEDURE NOTES
Airway  Date/Time: 5/20/2024 11:44 AM  Urgency: elective      Staffing  Performed: IDALIA   Authorized by: Romeo Bahena DO    Performed by: IDALIA Rodney  Patient location during procedure: OR    Indications and Patient Condition  Indications for airway management: anesthesia  Spontaneous Ventilation: absent  Sedation level: deep  Preoxygenated: yes  Mask difficulty assessment: 1 - vent by mask    Final Airway Details  Final airway type: supraglottic airway      Successful airway: Size 4     Number of attempts at approach: 1

## 2024-05-20 NOTE — OP NOTE
Repair Arthroscopy Rotator Cuff Shoulder (L), DEBREIDMENT AND Biceps Tenodesis ( General plus block anesthesia , Beachchair table ) (L) Operative Note     Date: 2024  OR Location: [facility]    Name: Mika Salamanca, : 1975, Age: 48 y.o., MRN: [id number], Sex: male    Diagnosis  Left shoulder biceps tendon subluation, tendinopathy    Procedures  Left shoulder arhtroscopy, extensive debridement  Left open subpectoral biceps tenodesis      Surgeons      * Tao Olea - Primary    Resident/Fellow/Other Assistant:  Jennifer Dudley MD, fellow    Procedure Summary  Anesthesia: General  ASA: II  Anesthesia Staff: Anesthesiologist: Romeo Bahena DO  C-AA: IDALIA Rodney  Estimated Blood Loss: 5mL  Intra-op Medications:   Administrations occurring from 1100 to 1300 on 24:   Medication Name Total Dose   EPINEPHrine (Adrenalin) 1 mg in sodium chloride 0.9 % 3,000 mL irrigation Cannot be calculated   lidocaine-epinephrine (Xylocaine W/EPI) 2 %-1:100,000 injection 10 mL   ceFAZolin in dextrose (iso-os) (Ancef) IVPB 2 g 2 g   midazolam (Versed) injection 2 mg 2 mg              Anesthesia Record               Intraprocedure I/O Totals       None           Specimen: No specimens collected     Staff:   Circulator: Alanna Ferrari RN  Scrub Person: Elisabet Mckinnon PA-C; Svetlana Contreras        Implants:  Implants       Type Name Action Serial No.      Implant SUTURE, FIBERTAK, KNOTLESS 1.8, GEN2 W/P2 - [id number] Implanted      Implant SUTURE, FIBERTAK, KNOTLESS 1.8, GEN2 W/P2 - [id number] Implanted               Findings: Degenerative SLAP tear biceps tendinopathy with subluxation no full-thickness upper border subscap tear degenerative labrum tear undersurface articular sided supraspinatus tear    Indications: Mika Salamanca is an 48 y.o. male who is having surgery for Strain of other muscles, fascia and tendons at shoulder and upper arm level, left arm, sequela  [S47.502S]  Unspecified subluxation of unspecified shoulder joint, initial encounter [S43.003A].     The patient was seen in the preoperative area. The risks, benefits, complications, treatment options, non-operative alternatives, expected recovery and outcomes were discussed with the patient. The possibilities of reaction to medication, pulmonary aspiration, injury to surrounding structures, bleeding, recurrent infection, the need for additional procedures, failure to diagnose a condition, and creating a complication requiring transfusion or operation were discussed with the patient. The patient concurred with the proposed plan, giving informed consent.  The site of surgery was properly noted/marked if necessary per policy. The patient has been actively warmed in preoperative area. Preoperative antibiotics have been ordered and given within 1 hours of incision. Venous thrombosis prophylaxis have been ordered including bilateral sequential compression devices    Procedure Details: INDICATIONS FOR PROCEDURE:   Mika Salamanca  is a 48 y.o.  year-old male  with ongoing shoulder pain s/p failure of conservative treatment and presents for elective shoulder arthoscopy. We reviewed the risks of surgery including, but not limited to, bleeding, infection, injury to surrounding tissues, nerves, vessels, DVT/PE, cuff nonhealing, late joint arthrosis; and the potential need for future surgery.  Patient understood fully and wished to proceed.       PROCEDURE IN DETAIL:   The patient was met in the preoperative holding area.  left shoulder was identified as the correct operative limb by myself, the patient as well as attending anesthesiologist, marked with an indelible marker.  Informed consent was in the chart.  The patient received regional nerve block by Anesthesia, was taken back to the OR, placed supine on the operative bed in stable condition.  At this point, a formal interdisciplinary Huddle was carried out  correctly identifying the patient, procedure, correct operative limb, and all necessary equipment. All in agreement.  The patient was surrendered under general anesthetic. Airway was secured with an endotracheal tube. Once patient was asleep, patient was positioned in the beach chair position with a foam face mask assuring neutral coronal and sagittal head neck alignment. Nonoperative arm was placed in a well-padded arm berumen.  SCDs were placed.  Operative arm was prepped and draped.  Patient received Ancef for antibiotics.  A time-out was called.  The trimano arm was used to control the arm throughout the case.  I infiltrated the subacromial space with 10 mL 1% lidocaine with epinephrine.  We then made a posterior portal.  The arthroscope was placed into the joint.  An interval portal and cannula were established and diagnostic arthroscopy ensued.  The biceps demonstrates a significant tendinosis and tearing longitudinally throughout its course.  It could be seen subluxing but there was no full-thickness subscapularis tendon tear identified.  There is some bulbous thickening and partial tearing of the upper border of the subscap which was gently debrided.  There was no tear that required repair.  Biceps tenotomy was then completed.  Debridement was done of the degenerative slap tear The rest of the shoulder did not demonstrate any significant arthrosis.  There was no loose bodies. The rotator cuff was demonstrated a partial articular sided tear of the anterior supraspinatus which was gently debrided with a shaver.  This was less than 10% of the thickness of the tendon did not require repair.  We then put the scope in subacromial space.  The bursectomy was completed. The rotator cuff was intact.  This completed the extensive debridement of the labrum, rotator cuff, and bursa. We then proceeded to subpectoral biceps tenodesis.  A 2 cm incision was made and actually creased over the pec tendon, taken sharply down to  subcutaneous tissue.  The inferior border of the pec was palpated and the fascia was opened.  Pec was retracted laterally.  The biceps was brought out into our wound, was whipstitched with #2 FiberLoop.  Remainder of the tendon was then cut. The Bovie was used to clear soft tissue from the groove followed by red handle rasp to create a bleeding bony bed for healing. Unicortical drill hole was then placed in the Arthrex all suture knotless anchor was then deployed.  The biceps was then tied to the loop and the loop wasshortened effectively bringing the biceps down to the anterior humeral cortex sutures and tied and then cut.  This completed the biceps tenodesis.  Now, the musculotendinous junction lied at the inferior border of the pec reproducing natural anatomy. Wound was then copiously irrigated.  Subcuticular closed with 2-0 Vicryl and 3-0 Monocryl and Dermabond for the skin.  Portals were closed with nylon.  At the end of the case, all sponge, needle, and instrument counts were correct.  Patient was then placed in abduction sling with a Polar Care unit.  Patient was extubated and taken to PACU   without complication.  Please note Jennifer Clements PA-C served as the first surgical assist as there is no available qualified resident.  Her assistance in the case was critical for key portions including patient positioning and prep, wound closure, barce application.   Complications:  None; patient tolerated the procedure well.    Disposition: PACU - hemodynamically stable.  Condition: stable       Attending Attestation: I was present and scrubbed for the entire procedure.    Tao Olea  Phone Number: [phone]

## 2024-05-20 NOTE — ANESTHESIA PREPROCEDURE EVALUATION
Patient: Mika Salamanca    Procedure Information       Date/Time: 05/20/24 1100    Procedures:       Repair Arthroscopy Rotator Cuff Shoulder (Left: Shoulder) - General plus block anesthesia      Biceps Tenodesis ( General plus block anesthesia , Beachchair table ) (Left: Shoulder) - General plus block anesthesia    Location: [facility] / Virtual [facility]    Surgeons: Tao Olea MD          Past Medical History:   Diagnosis Date    Abdominal distension (gaseous) 03/21/2022    Abdominal bloating    Abnormal findings on diagnostic imaging of other abdominal regions, including retroperitoneum 04/04/2019    Abnormal CT of the abdomen    COVID-19 08/01/2022    COVID-19 virus infection    Generalized abdominal pain 03/15/2019    Generalized abdominal pain    Lumbago with sciatica, right side 03/09/2017    Acute right-sided low back pain with right-sided sciatica    Metatarsalgia, left foot 07/11/2014    Metatarsalgia of left foot    Myalgia, other site 04/14/2021    Gluteal pain    Other conditions influencing health status 02/22/2018    History of cough    Other general symptoms and signs 03/19/2019    Abnormal finding    Pain in right knee 03/09/2017    Acute pain of right knee    Pelvic and perineal pain 03/15/2019    Pelvic pain in male    Personal history of other diseases of male genital organs 08/08/2018    History of prostatitis    Personal history of other diseases of the digestive system 04/04/2019    History of ileus    Personal history of other diseases of the musculoskeletal system and connective tissue 03/15/2019    History of back pain    Personal history of other diseases of the respiratory system 09/20/2019    History of acute sinusitis    Personal history of other diseases of the respiratory system     History of asthma    Personal history of other diseases of the respiratory system     History of bronchitis    Personal history of other diseases of the respiratory system 04/20/2015    History  of acute sinusitis    Personal history of other specified conditions 04/04/2019    History of abdominal pain    Personal history of other specified conditions 09/16/2019    History of fatigue    Right upper quadrant pain 02/10/2022    RUQ pain    Strain of muscle, fascia and tendon of the posterior muscle group at thigh level, left thigh, subsequent encounter 06/16/2021    Hamstring strain, left, subsequent encounter    Strain of muscle, fascia and tendon of the posterior muscle group at thigh level, right thigh, subsequent encounter 05/12/2021    Partial hamstring tear, right, subsequent encounter    Strain of muscle, fascia and tendon of the posterior muscle group at thigh level, unspecified thigh, initial encounter 04/13/2021    Hamstring tear    Strain of muscle, fascia and tendon of the posterior muscle group at thigh level, unspecified thigh, initial encounter 05/26/2021    Hamstring tear    Strain of other muscles, fascia and tendons at shoulder and upper arm level, right arm, initial encounter 01/21/2016    Strain of right trapezius muscle    Strain of unspecified muscle, fascia and tendon at shoulder and upper arm level, right arm, initial encounter 07/11/2014    Right shoulder strain     Past Surgical History:   Procedure Laterality Date    OTHER SURGICAL HISTORY  07/11/2014    Leg Repair    OTHER SURGICAL HISTORY  04/24/2019    Complete colonoscopy         Relevant Problems   Anesthesia (within normal limits)      Cardiac   (+) White coat syndrome without diagnosis of hypertension      Pulmonary   (+) Asthma (HHS-HCC)      Neuro   (+) Cervical radiculopathy      Musculoskeletal   (+) DJD (degenerative joint disease) of cervical spine   (+) Lumbar spondylosis      HEENT   (+) Nasal sinus congestion       Clinical information reviewed:                   NPO Detail:  No data recorded     Physical Exam    Airway  Mallampati: II  TM distance: >3 FB  Neck ROM: full     Cardiovascular    Dental    Pulmonary     Abdominal            Anesthesia Plan    History of general anesthesia?: yes  History of complications of general anesthesia?: no    ASA 2     general and regional     The patient is not a current smoker.  Patient was not previously instructed to abstain from smoking on day of procedure.  Patient did not smoke on day of procedure.  Education provided regarding risk of obstructive sleep apnea.  intravenous induction   Postoperative administration of opioids is intended.  Anesthetic plan and risks discussed with patient.  Use of blood products discussed with patient who consented to blood products.    Plan discussed with CRNA and CAA.

## 2024-05-28 ENCOUNTER — OFFICE VISIT (OUTPATIENT)
Dept: ORTHOPEDIC SURGERY | Facility: CLINIC | Age: 49
End: 2024-05-28
Payer: COMMERCIAL

## 2024-05-28 DIAGNOSIS — Z48.89 AFTERCARE FOLLOWING SURGERY: Primary | ICD-10-CM

## 2024-05-28 PROCEDURE — 99024 POSTOP FOLLOW-UP VISIT: CPT

## 2024-05-28 NOTE — PROGRESS NOTES
Doing well, no issues. Taking ASA.    Review of Systems  A complete review of systems was conducted, pertinent only to the HPI noted above.  Constitutional: None  Eyes: No additions to above history  Ears, Nose, Throat: No additions to above history  Cardiovascular: No additions to above history  Respiratory: No additions to above history  GI: No additions to above history  : No additions to above history  Skin/Neuro: No additions to above history  Endocrine/Heme/Lymph: No additions to above history  Immunologic: No additions to above history  Psychiatric: No additions to above history  Musculoskeletal: see above    Physical Exam  GEN: Alert and Oriented x 3  Constitutional: Well appearing, in no apparent distress.        Focused Musculoskeletal Exam:     LEFT  shoulder:  portal incisions closed, no signs of infection, sutures removed steris placed, biceps incision closed, no signs of infection, biceps fires, deltoid fires,  prom to 70 er to 20, ir to side    Sensation intact Ax/median/ulnar/radial distributions  Motor intact Ax/median/radial/ulnar/AIN/PIN    Arthroscopic findings  reviewed. Doing well.  Begin PT, follow up 4 weeks. All questions answered, patient in agreement with the plan.

## 2024-05-30 DIAGNOSIS — Z48.89 AFTERCARE FOLLOWING SURGERY: Primary | ICD-10-CM

## 2024-06-21 ENCOUNTER — OFFICE VISIT (OUTPATIENT)
Dept: ORTHOPEDIC SURGERY | Facility: CLINIC | Age: 49
End: 2024-06-21
Payer: COMMERCIAL

## 2024-06-21 VITALS — HEIGHT: 68 IN | BODY MASS INDEX: 25.91 KG/M2 | WEIGHT: 171 LBS

## 2024-06-21 DIAGNOSIS — Z48.89 AFTERCARE FOLLOWING SURGERY: Primary | ICD-10-CM

## 2024-06-21 PROCEDURE — 99024 POSTOP FOLLOW-UP VISIT: CPT

## 2024-06-21 PROCEDURE — 1036F TOBACCO NON-USER: CPT

## 2024-06-21 ASSESSMENT — PAIN DESCRIPTION - DESCRIPTORS: DESCRIPTORS: ACHING;SORE

## 2024-06-21 ASSESSMENT — PAIN SCALES - GENERAL: PAINLEVEL_OUTOF10: 2

## 2024-06-21 ASSESSMENT — PAIN - FUNCTIONAL ASSESSMENT: PAIN_FUNCTIONAL_ASSESSMENT: 0-10

## 2024-06-21 NOTE — PROGRESS NOTES
Doing well, no issues. Weaned out of sling. Continues with PT, although he says he was only covered for limited PT sessions, last visit scheduled for beginning of July.     Review of Systems  A complete review of systems was conducted, pertinent only to the HPI noted above.  Constitutional: None  Eyes: No additions to above history  Ears, Nose, Throat: No additions to above history  Cardiovascular: No additions to above history  Respiratory: No additions to above history  GI: No additions to above history  : No additions to above history  Skin/Neuro: No additions to above history  Endocrine/Heme/Lymph: No additions to above history  Immunologic: No additions to above history  Psychiatric: No additions to above history  Musculoskeletal: see above    Physical Exam  GEN: Alert and Oriented x 3  Constitutional: Well appearing, in no apparent distress.        Focused Musculoskeletal Exam:     LEFT  shoulder:  portal incisions closed, no signs of infection, biceps incision closed, no signs of infection, biceps fires, deltoid fires,  arom to 140 er to 60, ir L5. Negative popeyes     Sensation intact Ax/median/ulnar/radial distributions  Motor intact Ax/median/radial/ulnar/AIN/PIN    Doing well.  Continue PT, he may add in progressive biceps strengthening at 6 weeks post op. Given his restricted range of motion and weakness of the shoulder, continued PT is recommenced and necessary for full recovery. Additional PT order was placed. He will call us should he have any issues with this. Follow up 6 weeks. All questions answered, patient in agreement with the plan.

## 2024-06-25 ENCOUNTER — APPOINTMENT (OUTPATIENT)
Dept: ORTHOPEDIC SURGERY | Facility: CLINIC | Age: 49
End: 2024-06-25
Payer: COMMERCIAL

## 2024-07-08 ENCOUNTER — TELEPHONE (OUTPATIENT)
Dept: PRIMARY CARE | Facility: CLINIC | Age: 49
End: 2024-07-08
Payer: COMMERCIAL

## 2024-07-08 DIAGNOSIS — H81.10 BENIGN PAROXYSMAL POSITIONAL VERTIGO, UNSPECIFIED LATERALITY: ICD-10-CM

## 2024-07-08 RX ORDER — DIAZEPAM 10 MG/1
10 TABLET ORAL 2 TIMES DAILY PRN
Qty: 30 TABLET | Refills: 0 | Status: SHIPPED | OUTPATIENT
Start: 2024-07-08

## 2024-07-08 NOTE — TELEPHONE ENCOUNTER
Patient called and left a message on our voicemail requesting   Rx refill-Diazepam 10 mg tablets    Pharmacy:   [id number] - [place], [place] - [address]  AT [facility] [address]  [address] [place] [address]  Phone: [phone] Fax: [phone]      Patient phone: [phone]

## 2024-07-31 PROBLEM — R10.2 PELVIC PAIN: Status: ACTIVE | Noted: 2024-07-31

## 2024-07-31 PROBLEM — M25.561 ACUTE PAIN OF RIGHT KNEE: Status: ACTIVE | Noted: 2024-07-31

## 2024-07-31 PROBLEM — E66.3 OVERWEIGHT WITH BODY MASS INDEX (BMI) 25.0-29.9: Status: ACTIVE | Noted: 2024-07-31

## 2024-07-31 PROBLEM — R53.83 FATIGUE: Status: ACTIVE | Noted: 2024-07-31

## 2024-07-31 RX ORDER — AMOXICILLIN AND CLAVULANATE POTASSIUM 875; 125 MG/1; MG/1
TABLET, FILM COATED ORAL
COMMUNITY
Start: 2023-06-07

## 2024-08-02 ENCOUNTER — APPOINTMENT (OUTPATIENT)
Dept: ORTHOPEDIC SURGERY | Facility: CLINIC | Age: 49
End: 2024-08-02
Payer: COMMERCIAL

## 2024-08-02 DIAGNOSIS — Z48.89 AFTERCARE FOLLOWING SURGERY: Primary | ICD-10-CM

## 2024-08-02 PROCEDURE — 99024 POSTOP FOLLOW-UP VISIT: CPT

## 2024-08-02 ASSESSMENT — PAIN - FUNCTIONAL ASSESSMENT: PAIN_FUNCTIONAL_ASSESSMENT: NO/DENIES PAIN

## 2024-08-02 NOTE — PROGRESS NOTES
2.5 months out. Doing well. Has some stiffness in the morning but not having any pain. He did get approved for more PT sessions which he feels helped. No new issues.     Review of Systems  A complete review of systems was conducted, pertinent only to the HPI noted above.  Constitutional: None  Eyes: No additions to above history  Ears, Nose, Throat: No additions to above history  Cardiovascular: No additions to above history  Respiratory: No additions to above history  GI: No additions to above history  : No additions to above history  Skin/Neuro: No additions to above history  Endocrine/Heme/Lymph: No additions to above history  Immunologic: No additions to above history  Psychiatric: No additions to above history  Musculoskeletal: see above    Physical Exam  GEN: Alert and Oriented x 3  Constitutional: Well appearing, in no apparent distress.        Focused Musculoskeletal Exam:     LEFT  shoulder:  portal incisions closed, no signs of infection, biceps incision closed, no signs of infection, biceps fires, deltoid fires,  arom to 160 er to 60, ir T12. Negative popeyes     Sensation intact Ax/median/ulnar/radial distributions  Motor intact Ax/median/radial/ulnar/AIN/PIN    Doing well.  Finish remaining PT and continue home exercises. Given his improvement we do not need to see him back in the office. He is cleared to wear back into activities as tolerated after finishing with PT. He will call should any issues arise.  All questions answered, patient in agreement with the plan.

## 2024-08-08 ENCOUNTER — LAB (OUTPATIENT)
Dept: LAB | Facility: LAB | Age: 49
End: 2024-08-08
Payer: COMMERCIAL

## 2024-08-08 ENCOUNTER — APPOINTMENT (OUTPATIENT)
Dept: PRIMARY CARE | Facility: CLINIC | Age: 49
End: 2024-08-08
Payer: COMMERCIAL

## 2024-08-08 VITALS
TEMPERATURE: 96.8 F | BODY MASS INDEX: 25.69 KG/M2 | HEART RATE: 58 BPM | HEIGHT: 68 IN | SYSTOLIC BLOOD PRESSURE: 135 MMHG | OXYGEN SATURATION: 98 % | DIASTOLIC BLOOD PRESSURE: 97 MMHG | WEIGHT: 169.5 LBS

## 2024-08-08 DIAGNOSIS — Z79.899 CHRONIC PRESCRIPTION BENZODIAZEPINE USE: ICD-10-CM

## 2024-08-08 DIAGNOSIS — H81.10 BENIGN PAROXYSMAL POSITIONAL VERTIGO, UNSPECIFIED LATERALITY: ICD-10-CM

## 2024-08-08 DIAGNOSIS — I10 BENIGN ESSENTIAL HYPERTENSION: Primary | ICD-10-CM

## 2024-08-08 DIAGNOSIS — R42 VERTIGO: ICD-10-CM

## 2024-08-08 PROBLEM — R03.0 BLOOD PRESSURE ELEVATED WITHOUT HISTORY OF HTN: Status: RESOLVED | Noted: 2023-10-05 | Resolved: 2024-08-08

## 2024-08-08 PROBLEM — R03.0 WHITE COAT SYNDROME WITHOUT DIAGNOSIS OF HYPERTENSION: Status: RESOLVED | Noted: 2024-03-14 | Resolved: 2024-08-08

## 2024-08-08 LAB
AMPHETAMINES UR QL SCN: ABNORMAL
BARBITURATES UR QL SCN: ABNORMAL
BENZODIAZ UR QL SCN: ABNORMAL
BZE UR QL SCN: ABNORMAL
CANNABINOIDS UR QL SCN: ABNORMAL
FENTANYL+NORFENTANYL UR QL SCN: ABNORMAL
METHADONE UR QL SCN: ABNORMAL
OPIATES UR QL SCN: ABNORMAL
OXYCODONE+OXYMORPHONE UR QL SCN: ABNORMAL
PCP UR QL SCN: ABNORMAL

## 2024-08-08 PROCEDURE — 1036F TOBACCO NON-USER: CPT | Performed by: INTERNAL MEDICINE

## 2024-08-08 PROCEDURE — 99214 OFFICE O/P EST MOD 30 MIN: CPT | Performed by: INTERNAL MEDICINE

## 2024-08-08 PROCEDURE — 3075F SYST BP GE 130 - 139MM HG: CPT | Performed by: INTERNAL MEDICINE

## 2024-08-08 PROCEDURE — 80346 BENZODIAZEPINES1-12: CPT

## 2024-08-08 PROCEDURE — 3080F DIAST BP >= 90 MM HG: CPT | Performed by: INTERNAL MEDICINE

## 2024-08-08 PROCEDURE — 3008F BODY MASS INDEX DOCD: CPT | Performed by: INTERNAL MEDICINE

## 2024-08-08 PROCEDURE — 80307 DRUG TEST PRSMV CHEM ANLYZR: CPT

## 2024-08-08 RX ORDER — AMLODIPINE BESYLATE 2.5 MG/1
2.5 TABLET ORAL DAILY
Qty: 90 TABLET | Refills: 3 | Status: SHIPPED | OUTPATIENT
Start: 2024-08-08 | End: 2025-08-08

## 2024-08-08 ASSESSMENT — ENCOUNTER SYMPTOMS
COUGH: 0
CHILLS: 0
DIZZINESS: 1
PALPITATIONS: 0
FEVER: 0
SHORTNESS OF BREATH: 0
FATIGUE: 0

## 2024-08-08 ASSESSMENT — PATIENT HEALTH QUESTIONNAIRE - PHQ9
2. FEELING DOWN, DEPRESSED OR HOPELESS: NOT AT ALL
SUM OF ALL RESPONSES TO PHQ9 QUESTIONS 1 AND 2: 0
1. LITTLE INTEREST OR PLEASURE IN DOING THINGS: NOT AT ALL

## 2024-08-08 NOTE — PROGRESS NOTES
CHIEF COMPLAINT  vertigo    HISTORY OF PRESENT ILLNESS  Mika Salamanca is a 48 y.o. male presents today for follow up of vertigo     HPI    Vertigo is generally stable.  Tends to come in waves.  Diazepam helps, does not need refill today.  He had thorough evaluation several years ago and tried vestibular exercises without relief.    Shoulder surgery went well, recently finished PT.  He is not full back to cardio.  Checks BP at home, DBP usually in the 80's.      OARRS:  Yaquelin Roldan MD on 8/8/2024  9:49 AM  I have personally reviewed the OARRS report for Mika Salamanca. I have considered the risks of abuse, dependence, addiction and diversion and I believe that it is clinically appropriate for Mika Salamanca to be prescribed this medication    Is the patient prescribed a combination of a benzodiazepine and opioid?  No    Last Urine Drug Screen / ordered today: Yes  No results found for this or any previous visit (from the past 8760 hour(s)).      Controlled Substance Agreement:  Date of the Last Agreement: 2/5/24  Reviewed Controlled Substance Agreement including but not limited to the benefits, risks, and alternatives to treatment with a Controlled Substance medication(s).    Benzodiazepines:  What is the patient's goal of therapy? Relief of episodic vertigo  Is this being achieved with current treatment? yes    Activities of Daily Living:   Is your overall impression that this patient is benefiting (symptom reduction outweighs side effects) from benzodiazepine therapy? Yes     1. Physical Functioning: Better  2. Family Relationship: Better  3. Social Relationship: Better  4. Mood: Better  5. Sleep Patterns: Better  6. Overall Function: Better    REVIEW OF SYSTEMS  Review of Systems   Constitutional:  Negative for chills, fatigue and fever.   Respiratory:  Negative for cough and shortness of breath.    Cardiovascular:  Negative for chest pain, palpitations and leg swelling.  "  Neurological:  Positive for dizziness.        Intermittent vertigo       ALLERGIES  Sulfamethoxazole-trimethoprim    MEDICATIONS  Current Outpatient Medications   Medication Instructions    albuterol 90 mcg/actuation inhaler 1-2 puffs, inhalation, every 4-6 hours as needed for wheezing<BR>    amLODIPine (NORVASC) 2.5 mg, oral, Daily    diazePAM (VALIUM) 10 mg, oral, 2 times daily PRN    fluticasone (Flonase) 50 mcg/actuation nasal spray nasal, Daily RT    ibuprofen (Motrin IB) 200 mg tablet Take 3 tablets (600 mg) by mouth every 6 hours if needed.    L. acidophilus/Bifid. animalis 32 billion cell capsule Take 1 capsule by mouth once daily.    montelukast (Singulair) 10 mg tablet 1 tablet, oral, Daily, As directed    multivit-min-FA-lycopen-lutein (ESSENTIAL Man) 0.4-2-250 mg-mg-mcg tablet Multi For Him Oral Tablet   Refills: 0       Active    omeprazole (PRILOSEC) 20 mg, oral, Daily before breakfast, Do not crush or chew.       TOBACCO USE  Social History     Tobacco Use   Smoking Status Former    Types: Cigarettes   Smokeless Tobacco Never       DEPRESSION SCREEN  Over the past 2 weeks, how often have you been bothered by any of the following problems?  Little interest or pleasure in doing things: Not at all  Feeling down, depressed, or hopeless: Not at all    SURGICAL HISTORY  Past Surgical History:  07/11/2014: OTHER SURGICAL HISTORY      Comment:  Leg Repair  04/24/2019: OTHER SURGICAL HISTORY      Comment:  Complete colonoscopy       OBJECTIVE    BP (!) 135/97   Pulse 58   Temp 36 °C (96.8 °F)   Ht 1.727 m (5' 8\")   Wt 76.9 kg (169 lb 8 oz)   SpO2 98%   BMI 25.77 kg/m²    BMI: Estimated body mass index is 25.77 kg/m² as calculated from the following:    Height as of this encounter: 1.727 m (5' 8\").    Weight as of this encounter: 76.9 kg (169 lb 8 oz).    BP Readings from Last 3 Encounters:   08/08/24 (!) 135/97   05/20/24 132/86   05/16/24 131/77      Wt Readings from Last 3 Encounters:   08/08/24 76.9 " kg (169 lb 8 oz)   06/21/24 77.6 kg (171 lb)   05/20/24 77.8 kg (171 lb 8.3 oz)        PHYSICAL EXAM  Physical Exam  Constitutional:       Appearance: Normal appearance.   HENT:      Head: Normocephalic and atraumatic.   Cardiovascular:      Rate and Rhythm: Normal rate and regular rhythm.   Pulmonary:      Effort: Pulmonary effort is normal. No respiratory distress.      Breath sounds: Normal breath sounds. No wheezing.   Musculoskeletal:      Right lower leg: No edema.      Left lower leg: No edema.   Neurological:      General: No focal deficit present.      Mental Status: He is alert and oriented to person, place, and time. Mental status is at baseline.   Psychiatric:         Mood and Affect: Mood normal.         Behavior: Behavior normal.         Thought Content: Thought content normal.         Judgment: Judgment normal.          ASSESSMENT AND PLAN  Assessment/Plan   Problem List Items Addressed This Visit       BPPV (benign paroxysmal positional vertigo)    Chronic prescription benzodiazepine use    Relevant Orders    Drug Screen, Urine With Reflex to Confirmation    Vertigo     Other Visit Diagnoses       Benign essential hypertension    -  Primary    Relevant Medications    amLODIPine (Norvasc) 2.5 mg tablet            BPPV - has been on diazepam PRN for many years good good results.  CSA was updated 2/5/24.  OARRS reviewed.   UDS 8/8/24.  Does not need refill today.  Continue follow-up every 90 days.     Hypertension - diastolic BP persistently in 80's.  Start low dose amlodipine.  Continue with healthy habits as able.

## 2024-08-09 ENCOUNTER — TELEPHONE (OUTPATIENT)
Dept: PRIMARY CARE | Facility: CLINIC | Age: 49
End: 2024-08-09
Payer: COMMERCIAL

## 2024-08-09 DIAGNOSIS — U07.1 COVID-19 VIRUS INFECTION: Primary | ICD-10-CM

## 2024-08-09 NOTE — TELEPHONE ENCOUNTER
Pt called, he tested positive for covid yesterday 08/08/24, symptoms started the same day. Pt is asking for an order for Paxlovid.         [id number] [place], [place] - [address] AT [facility] [address]  & [address]   [address] [address]  Phone: [phone] Fax: [phone]

## 2024-08-13 LAB
1OH-MIDAZOLAM UR CFM-MCNC: <25 NG/ML
7AMINOCLONAZEPAM UR CFM-MCNC: <25 NG/ML
A-OH ALPRAZ UR CFM-MCNC: <25 NG/ML
ALPRAZ UR CFM-MCNC: <25 NG/ML
CHLORDIAZEP UR CFM-MCNC: <25 NG/ML
CLONAZEPAM UR CFM-MCNC: <25 NG/ML
DIAZEPAM UR CFM-MCNC: <25 NG/ML
LORAZEPAM UR CFM-MCNC: <25 NG/ML
MIDAZOLAM UR CFM-MCNC: <25 NG/ML
NORDIAZEPAM UR CFM-MCNC: 252 NG/ML
OXAZEPAM UR CFM-MCNC: 381 NG/ML
TEMAZEPAM UR CFM-MCNC: 481 NG/ML

## 2024-09-16 ENCOUNTER — TELEPHONE (OUTPATIENT)
Dept: PRIMARY CARE | Facility: CLINIC | Age: 49
End: 2024-09-16
Payer: COMMERCIAL

## 2024-09-16 DIAGNOSIS — J45.20 MILD INTERMITTENT ASTHMA WITHOUT COMPLICATION (HHS-HCC): Primary | ICD-10-CM

## 2024-09-16 RX ORDER — MONTELUKAST SODIUM 10 MG/1
10 TABLET ORAL DAILY
Qty: 90 TABLET | Refills: 3 | Status: SHIPPED | OUTPATIENT
Start: 2024-09-16 | End: 2025-09-16

## 2024-11-07 ENCOUNTER — APPOINTMENT (OUTPATIENT)
Dept: PRIMARY CARE | Facility: CLINIC | Age: 49
End: 2024-11-07
Payer: COMMERCIAL

## 2024-11-07 VITALS
HEIGHT: 68 IN | DIASTOLIC BLOOD PRESSURE: 83 MMHG | WEIGHT: 172.02 LBS | SYSTOLIC BLOOD PRESSURE: 129 MMHG | HEART RATE: 62 BPM | OXYGEN SATURATION: 98 % | TEMPERATURE: 96.8 F | BODY MASS INDEX: 26.07 KG/M2

## 2024-11-07 DIAGNOSIS — Z79.899 CHRONIC PRESCRIPTION BENZODIAZEPINE USE: ICD-10-CM

## 2024-11-07 DIAGNOSIS — I10 BENIGN ESSENTIAL HYPERTENSION: ICD-10-CM

## 2024-11-07 DIAGNOSIS — H81.10 BENIGN PAROXYSMAL POSITIONAL VERTIGO, UNSPECIFIED LATERALITY: ICD-10-CM

## 2024-11-07 DIAGNOSIS — R42 VERTIGO: Primary | ICD-10-CM

## 2024-11-07 PROCEDURE — 99213 OFFICE O/P EST LOW 20 MIN: CPT | Performed by: INTERNAL MEDICINE

## 2024-11-07 PROCEDURE — 3008F BODY MASS INDEX DOCD: CPT | Performed by: INTERNAL MEDICINE

## 2024-11-07 PROCEDURE — 3074F SYST BP LT 130 MM HG: CPT | Performed by: INTERNAL MEDICINE

## 2024-11-07 PROCEDURE — 3079F DIAST BP 80-89 MM HG: CPT | Performed by: INTERNAL MEDICINE

## 2024-11-07 PROCEDURE — 1036F TOBACCO NON-USER: CPT | Performed by: INTERNAL MEDICINE

## 2024-11-07 RX ORDER — DIAZEPAM 10 MG/1
10 TABLET ORAL 2 TIMES DAILY PRN
Qty: 30 TABLET | Refills: 0 | Status: SHIPPED | OUTPATIENT
Start: 2024-11-07

## 2024-11-07 ASSESSMENT — ENCOUNTER SYMPTOMS
DIZZINESS: 1
FEVER: 0
COUGH: 0
FATIGUE: 0
DYSPHORIC MOOD: 0
CHILLS: 0
SHORTNESS OF BREATH: 0
PALPITATIONS: 0
NERVOUS/ANXIOUS: 0

## 2024-11-07 ASSESSMENT — PATIENT HEALTH QUESTIONNAIRE - PHQ9
1. LITTLE INTEREST OR PLEASURE IN DOING THINGS: NOT AT ALL
SUM OF ALL RESPONSES TO PHQ9 QUESTIONS 1 AND 2: 0
2. FEELING DOWN, DEPRESSED OR HOPELESS: NOT AT ALL

## 2024-11-07 NOTE — PROGRESS NOTES
CHIEF COMPLAINT  Vertigo    HISTORY OF PRESENT ILLNESS  Mika Salamanca is a 49 y.o. male presents today for follow up of Vertigo    HPI    Health has been stable since last visit.  Checks BP at home, has been well controlled.    Needs refill on diazepam, uses PRN of vertigo.  No new complaints.     OARRS:  Yaquelin Roldan MD on 11/7/2024  9:07 AM  I have personally reviewed the OARRS report for Mika Salamanca. I have considered the risks of abuse, dependence, addiction and diversion and I believe that it is clinically appropriate for Mika Salamanca to be prescribed this medication    Is the patient prescribed a combination of a benzodiazepine and opioid?  No    Last Urine Drug Screen / ordered today: No  Recent Results (from the past 8760 hours)   Benzodiazepine Confirmation, Urine    Collection Time: 08/08/24  8:47 AM   Result Value Ref Range    Clonazepam <25 <25 ng/mL    7-Aminoclonazepam <25 <25 ng/mL    Alprazolam <25 <25 ng/mL    Alpha-Hydroxyalprazolam <25 <25 ng/mL    Midazolam <25 <25 ng/mL    Alpha-Hydroxymidazolam <25 <25 ng/mL    Chlordiazepoxide <25 <25 ng/mL    Diazepam <25 <25 ng/mL    Nordiazepam 252 (H) <25 ng/mL    Temazepam 481 (H) <25 ng/mL    Oxazepam 381 (H) <25 ng/mL    Lorazepam <25 <25 ng/mL   Drug Screen, Urine With Reflex to Confirmation    Collection Time: 08/08/24  8:47 AM   Result Value Ref Range    Amphetamine Screen, Urine Presumptive Negative Presumptive Negative    Barbiturate Screen, Urine Presumptive Negative Presumptive Negative    Benzodiazepines Screen, Urine Presumptive Positive (A) Presumptive Negative    Cannabinoid Screen, Urine Presumptive Negative Presumptive Negative    Cocaine Metabolite Screen, Urine Presumptive Negative Presumptive Negative    Fentanyl Screen, Urine Presumptive Negative Presumptive Negative    Opiate Screen, Urine Presumptive Negative Presumptive Negative    Oxycodone Screen, Urine Presumptive Negative Presumptive Negative     PCP Screen, Urine Presumptive Negative Presumptive Negative    Methadone Screen, Urine Presumptive Negative Presumptive Negative     Results are as expected.         Controlled Substance Agreement:  Date of the Last Agreement: 2/5/24  Reviewed Controlled Substance Agreement including but not limited to the benefits, risks, and alternatives to treatment with a Controlled Substance medication(s).    Benzodiazepines:  What is the patient's goal of therapy? Relief of vertigo  Is this being achieved with current treatment? yes      Activities of Daily Living:   Is your overall impression that this patient is benefiting (symptom reduction outweighs side effects) from benzodiazepine therapy? Yes     1. Physical Functioning: Better  2. Family Relationship: Better  3. Social Relationship: Better  4. Mood: Better  5. Sleep Patterns: Better  6. Overall Function: Better    REVIEW OF SYSTEMS  Review of Systems   Constitutional:  Negative for chills, fatigue and fever.   Respiratory:  Negative for cough and shortness of breath.    Cardiovascular:  Negative for chest pain, palpitations and leg swelling.   Neurological:  Positive for dizziness.        Intermittent vertigo   Psychiatric/Behavioral:  Negative for dysphoric mood. The patient is not nervous/anxious.        ALLERGIES  Sulfamethoxazole-trimethoprim    MEDICATIONS  Current Outpatient Medications   Medication Instructions    albuterol 90 mcg/actuation inhaler 1-2 puffs    amLODIPine (NORVASC) 2.5 mg, oral, Daily    diazePAM (VALIUM) 10 mg, oral, 2 times daily PRN    fluticasone (Flonase) 50 mcg/actuation nasal spray Daily RT    ibuprofen (Motrin IB) 200 mg tablet Take 3 tablets (600 mg) by mouth every 6 hours if needed.    L. acidophilus/Bifid. animalis 32 billion cell capsule Take 1 capsule by mouth once daily.    montelukast (SINGULAIR) 10 mg, oral, Daily, As directed    multivit-min-FA-lycopen-lutein (ESSENTIAL Man) 0.4-2-250 mg-mg-mcg tablet Multi For Him Oral Tablet    "Refills: 0       Active       TOBACCO USE  Social History     Tobacco Use   Smoking Status Former    Types: Cigarettes   Smokeless Tobacco Never       DEPRESSION SCREEN  Over the past 2 weeks, how often have you been bothered by any of the following problems?  Little interest or pleasure in doing things: Not at all  Feeling down, depressed, or hopeless: Not at all    SURGICAL HISTORY  Past Surgical History:  07/11/2014: OTHER SURGICAL HISTORY      Comment:  Leg Repair  04/24/2019: OTHER SURGICAL HISTORY      Comment:  Complete colonoscopy       OBJECTIVE    /83   Pulse 62   Temp 36 °C (96.8 °F)   Ht 1.727 m (5' 8\")   Wt 78 kg (172 lb 0.3 oz)   SpO2 98%   BMI 26.16 kg/m²    BMI: Estimated body mass index is 26.16 kg/m² as calculated from the following:    Height as of this encounter: 1.727 m (5' 8\").    Weight as of this encounter: 78 kg (172 lb 0.3 oz).    BP Readings from Last 3 Encounters:   11/07/24 129/83   08/08/24 (!) 135/97   05/20/24 132/86      Wt Readings from Last 3 Encounters:   11/07/24 78 kg (172 lb 0.3 oz)   08/08/24 76.9 kg (169 lb 8 oz)   06/21/24 77.6 kg (171 lb)        PHYSICAL EXAM  Physical Exam  Constitutional:       Appearance: Normal appearance.   HENT:      Head: Normocephalic and atraumatic.   Cardiovascular:      Rate and Rhythm: Normal rate and regular rhythm.      Heart sounds: No murmur heard.  Pulmonary:      Effort: Pulmonary effort is normal. No respiratory distress.      Breath sounds: Normal breath sounds. No wheezing.   Neurological:      General: No focal deficit present.      Mental Status: He is alert and oriented to person, place, and time. Mental status is at baseline.   Psychiatric:         Mood and Affect: Mood normal.         Behavior: Behavior normal.         Thought Content: Thought content normal.         Judgment: Judgment normal.          ASSESSMENT AND PLAN  Assessment/Plan   Problem List Items Addressed This Visit       BPPV (benign paroxysmal positional " vertigo)    Relevant Medications    diazePAM (Valium) 10 mg tablet    Chronic prescription benzodiazepine use    Vertigo - Primary    Benign essential hypertension     BPPV - has been on diazepam PRN for many years good good results.  CSA was updated 2/5/24.  OARRS reviewed.   UDS 8/8/24.  Rx refilled.  Continue follow-up every 90 days.     Hypertension - improved.  Continue current medication and home monitoring.  Continue with healthy habits as able.

## 2024-12-11 ENCOUNTER — PREP FOR PROCEDURE (OUTPATIENT)
Dept: OTOLARYNGOLOGY | Facility: CLINIC | Age: 49
End: 2024-12-11

## 2024-12-11 ENCOUNTER — APPOINTMENT (OUTPATIENT)
Dept: OTOLARYNGOLOGY | Facility: CLINIC | Age: 49
End: 2024-12-11
Payer: COMMERCIAL

## 2024-12-11 VITALS
BODY MASS INDEX: 27.25 KG/M2 | SYSTOLIC BLOOD PRESSURE: 152 MMHG | WEIGHT: 179.8 LBS | DIASTOLIC BLOOD PRESSURE: 94 MMHG | HEIGHT: 68 IN | TEMPERATURE: 97.5 F

## 2024-12-11 DIAGNOSIS — J34.2 DEVIATED NASAL SEPTUM: Primary | ICD-10-CM

## 2024-12-11 DIAGNOSIS — J34.3 HYPERTROPHY OF BOTH INFERIOR NASAL TURBINATES: ICD-10-CM

## 2024-12-11 PROCEDURE — 3008F BODY MASS INDEX DOCD: CPT | Performed by: OTOLARYNGOLOGY

## 2024-12-11 PROCEDURE — 3077F SYST BP >= 140 MM HG: CPT | Performed by: OTOLARYNGOLOGY

## 2024-12-11 PROCEDURE — 99214 OFFICE O/P EST MOD 30 MIN: CPT | Performed by: OTOLARYNGOLOGY

## 2024-12-11 PROCEDURE — 1036F TOBACCO NON-USER: CPT | Performed by: OTOLARYNGOLOGY

## 2024-12-11 PROCEDURE — 3080F DIAST BP >= 90 MM HG: CPT | Performed by: OTOLARYNGOLOGY

## 2024-12-11 NOTE — H&P
Impression:  1. Deviated nasal septum        2. Hypertrophy of both inferior nasal turbinates             RECOMMENDATIONS/PLAN :  The various risks and benefits, complications and alternatives and expected course of recovery were explained to the patient and he would like to proceed with a nasal septoplasty with bilateral inferior turbinate Coblation/resection.  We will set this up in January at [facility].      **This electronic medical record note was created with the use of voice recognition software.  Despite proofreading, typographical or grammatical errors may be present that could affect meaning of content **    Subjective   Patient ID:     Mika Salamanca is a 49 y.o. male who presents to the office today as a checkup on his nose.  He has been flushing his nose and sinuses with a sinus rinse kit and using his Flonase for the last few months.  Unfortunately he still has difficulty breathing through his nose, worse on the right side.  He has had previous trauma to his nose and has not been able to breathe from that right side since.  He denies any infectious drainage fever or chills.    ROS:  A detailed 12 system review of systems is noted on the intake form has been reviewed with the patient with details noted in the HPI and scanned into the patient's medical record.    Objective     Past Medical History:   Diagnosis Date    Abdominal distension (gaseous) 03/21/2022    Abdominal bloating    Abnormal findings on diagnostic imaging of other abdominal regions, including retroperitoneum 04/04/2019    Abnormal CT of the abdomen    Asthma     Blood pressure elevated without history of HTN 10/05/2023    COVID-19 08/01/2022    COVID-19 virus infection    Generalized abdominal pain 03/15/2019    Generalized abdominal pain    Lumbago with sciatica, right side 03/09/2017    Acute right-sided low back pain with right-sided sciatica    Metatarsalgia, left foot 07/11/2014    Metatarsalgia of left foot     Myalgia, other site 04/14/2021    Gluteal pain    Other conditions influencing health status 02/22/2018    History of cough    Other general symptoms and signs 03/19/2019    Abnormal finding    Pain in right knee 03/09/2017    Acute pain of right knee    Pelvic and perineal pain 03/15/2019    Pelvic pain in male    Personal history of other diseases of male genital organs 08/08/2018    History of prostatitis    Personal history of other diseases of the digestive system 04/04/2019    History of ileus    Personal history of other diseases of the musculoskeletal system and connective tissue 03/15/2019    History of back pain    Personal history of other diseases of the respiratory system 09/20/2019    History of acute sinusitis    Personal history of other diseases of the respiratory system     History of asthma    Personal history of other diseases of the respiratory system     History of bronchitis    Personal history of other diseases of the respiratory system 04/20/2015    History of acute sinusitis    Personal history of other specified conditions 04/04/2019    History of abdominal pain    Personal history of other specified conditions 09/16/2019    History of fatigue    Right upper quadrant pain 02/10/2022    RUQ pain    Strain of muscle, fascia and tendon of the posterior muscle group at thigh level, left thigh, subsequent encounter 06/16/2021    Hamstring strain, left, subsequent encounter    Strain of muscle, fascia and tendon of the posterior muscle group at thigh level, right thigh, subsequent encounter 05/12/2021    Partial hamstring tear, right, subsequent encounter    Strain of muscle, fascia and tendon of the posterior muscle group at thigh level, unspecified thigh, initial encounter 04/13/2021    Hamstring tear    Strain of muscle, fascia and tendon of the posterior muscle group at thigh level, unspecified thigh, initial encounter 05/26/2021    Hamstring tear    Strain of other muscles, fascia and  tendons at shoulder and upper arm level, right arm, initial encounter 01/21/2016    Strain of right trapezius muscle    Strain of unspecified muscle, fascia and tendon at shoulder and upper arm level, right arm, initial encounter 07/11/2014    Right shoulder strain    White coat syndrome without diagnosis of hypertension 03/14/2024        Past Surgical History:   Procedure Laterality Date    OTHER SURGICAL HISTORY  07/11/2014    Leg Repair    OTHER SURGICAL HISTORY  04/24/2019    Complete colonoscopy        Allergies   Allergen Reactions    Sulfamethoxazole-Trimethoprim Unknown          Current Outpatient Medications:     amLODIPine (Norvasc) 2.5 mg tablet, Take 1 tablet (2.5 mg) by mouth once daily., Disp: 90 tablet, Rfl: 3    diazePAM (Valium) 10 mg tablet, Take 1 tablet (10 mg) by mouth 2 times a day as needed for anxiety (vertigo)., Disp: 30 tablet, Rfl: 0    fluticasone (Flonase) 50 mcg/actuation nasal spray, Administer into affected nostril(s) once daily., Disp: , Rfl:     ibuprofen (Motrin IB) 200 mg tablet, Take 3 tablets (600 mg) by mouth every 6 hours if needed., Disp: , Rfl:     L. acidophilus/Bifid. animalis 32 billion cell capsule, Take 1 capsule by mouth once daily., Disp: , Rfl:     montelukast (Singulair) 10 mg tablet, Take 1 tablet (10 mg) by mouth once daily. As directed, Disp: 90 tablet, Rfl: 3    multivit-min-FA-lycopen-lutein (ESSENTIAL Man) 0.4-2-250 mg-mg-mcg tablet, Multi For Him Oral Tablet  Refills: 0     Active, Disp: , Rfl:     albuterol 90 mcg/actuation inhaler, Inhale 1-2 puffs. every 4-6 hours as needed for wheezing, Disp: , Rfl:      Tobacco Use: Medium Risk (12/11/2024)    Patient History     Smoking Tobacco Use: Former     Smokeless Tobacco Use: Never     Passive Exposure: Not on file        Alcohol Use: Not on file        Social History     Substance and Sexual Activity   Drug Use Yes    Types: Marijuana    Comment: edibles on occasion; last used 1 year ago        Physical  "Exam:  Visit Vitals  BP (!) 152/94   Temp 36.4 °C (97.5 °F) (Temporal)   Ht 1.727 m (5' 8\")   Wt 81.6 kg (179 lb 12.8 oz)   BMI 27.34 kg/m²   Smoking Status Former   BSA 1.98 m²      General: Patient is alert, oriented, cooperative in no apparent distress.  Head: Normocephalic, atraumatic.  Eyes: PERRL, EOMI, Conjunctiva is clear. No nystagmus.  Ears: Right Ear-- Pinna is normal.  External auditory canal is patent. Tympanic membrane is [intact, translucent and has good mobility with my pneumatic otoscope. No effusion].  Mastoid is nontender.  Left ear-- Pinna is normal.  External auditory canal is patent. Tympanic membrane is [intact, translucent and has good mobility with my pneumatic otoscope.  No effusion].  Mastoid is nontender.  Nose: Septum is severely deviated to the right pushing on the right lateral nasal wall with about 90% obstruction..  No septal perforation or lesions. No septal hematoma/ seroma.  No signs of bleeding.  Inferior turbinates are moderately swollen and obstructive left greater than right.   No evidence of intranasal polyps.  No infectious drainage.  Throat:  Floor of mouth is clear, no masses.  Tongue appears normal, no lesions or masses. Gums, gingiva, buccal mucosa appear pink and moist, no lesions. Teeth are in good repair.  No obvious dental infections.  Peritonsillar regions appear symmetric without swelling.  Hard and soft palate appear normal, no obvious cleft. Uvula is midline.  Oropharynx: No lesions. Retropharyngeal wall is flat.  No active postnasal drip.  Neck: Supple,  no lymphadenopathy.  No masses.  Salivary Glands: Symmetric bilaterally.  No palpable masses.  No evidence of acute infection or salivary stones  Neurologic: Cranial Nerves 2-12 are grossly intact without focal deficits. Cerebellar function testing is normal.   Cardiovascular: Heart regular rate rhythm without murmur  Lungs: Clear to auscultation bilaterally  Abdomen: Soft nontender bowel sounds present x " 4  Extremities: No clubbing cyanosis or edema    Results:   []    Procedure:   []    Georges Wilson, DO

## 2024-12-11 NOTE — PROGRESS NOTES
Impression:  1. Deviated nasal septum        2. Hypertrophy of both inferior nasal turbinates             RECOMMENDATIONS/PLAN :  The various risks and benefits, complications and alternatives and expected course of recovery were explained to the patient and he would like to proceed with a nasal septoplasty with bilateral inferior turbinate Coblation/resection.  We will set this up in January at [facility].      **This electronic medical record note was created with the use of voice recognition software.  Despite proofreading, typographical or grammatical errors may be present that could affect meaning of content **    Subjective   Patient ID:     Mika Salamanca is a 49 y.o. male who presents to the office today as a checkup on his nose.  He has been flushing his nose and sinuses with a sinus rinse kit and using his Flonase for the last few months.  Unfortunately he still has difficulty breathing through his nose, worse on the right side.  He has had previous trauma to his nose and has not been able to breathe from that right side since.  He denies any infectious drainage fever or chills.    ROS:  A detailed 12 system review of systems is noted on the intake form has been reviewed with the patient with details noted in the HPI and scanned into the patient's medical record.    Objective     Past Medical History:   Diagnosis Date    Abdominal distension (gaseous) 03/21/2022    Abdominal bloating    Abnormal findings on diagnostic imaging of other abdominal regions, including retroperitoneum 04/04/2019    Abnormal CT of the abdomen    Asthma     Blood pressure elevated without history of HTN 10/05/2023    COVID-19 08/01/2022    COVID-19 virus infection    Generalized abdominal pain 03/15/2019    Generalized abdominal pain    Lumbago with sciatica, right side 03/09/2017    Acute right-sided low back pain with right-sided sciatica    Metatarsalgia, left foot 07/11/2014    Metatarsalgia of left foot     Myalgia, other site 04/14/2021    Gluteal pain    Other conditions influencing health status 02/22/2018    History of cough    Other general symptoms and signs 03/19/2019    Abnormal finding    Pain in right knee 03/09/2017    Acute pain of right knee    Pelvic and perineal pain 03/15/2019    Pelvic pain in male    Personal history of other diseases of male genital organs 08/08/2018    History of prostatitis    Personal history of other diseases of the digestive system 04/04/2019    History of ileus    Personal history of other diseases of the musculoskeletal system and connective tissue 03/15/2019    History of back pain    Personal history of other diseases of the respiratory system 09/20/2019    History of acute sinusitis    Personal history of other diseases of the respiratory system     History of asthma    Personal history of other diseases of the respiratory system     History of bronchitis    Personal history of other diseases of the respiratory system 04/20/2015    History of acute sinusitis    Personal history of other specified conditions 04/04/2019    History of abdominal pain    Personal history of other specified conditions 09/16/2019    History of fatigue    Right upper quadrant pain 02/10/2022    RUQ pain    Strain of muscle, fascia and tendon of the posterior muscle group at thigh level, left thigh, subsequent encounter 06/16/2021    Hamstring strain, left, subsequent encounter    Strain of muscle, fascia and tendon of the posterior muscle group at thigh level, right thigh, subsequent encounter 05/12/2021    Partial hamstring tear, right, subsequent encounter    Strain of muscle, fascia and tendon of the posterior muscle group at thigh level, unspecified thigh, initial encounter 04/13/2021    Hamstring tear    Strain of muscle, fascia and tendon of the posterior muscle group at thigh level, unspecified thigh, initial encounter 05/26/2021    Hamstring tear    Strain of other muscles, fascia and  tendons at shoulder and upper arm level, right arm, initial encounter 01/21/2016    Strain of right trapezius muscle    Strain of unspecified muscle, fascia and tendon at shoulder and upper arm level, right arm, initial encounter 07/11/2014    Right shoulder strain    White coat syndrome without diagnosis of hypertension 03/14/2024        Past Surgical History:   Procedure Laterality Date    OTHER SURGICAL HISTORY  07/11/2014    Leg Repair    OTHER SURGICAL HISTORY  04/24/2019    Complete colonoscopy        Allergies   Allergen Reactions    Sulfamethoxazole-Trimethoprim Unknown          Current Outpatient Medications:     amLODIPine (Norvasc) 2.5 mg tablet, Take 1 tablet (2.5 mg) by mouth once daily., Disp: 90 tablet, Rfl: 3    diazePAM (Valium) 10 mg tablet, Take 1 tablet (10 mg) by mouth 2 times a day as needed for anxiety (vertigo)., Disp: 30 tablet, Rfl: 0    fluticasone (Flonase) 50 mcg/actuation nasal spray, Administer into affected nostril(s) once daily., Disp: , Rfl:     ibuprofen (Motrin IB) 200 mg tablet, Take 3 tablets (600 mg) by mouth every 6 hours if needed., Disp: , Rfl:     L. acidophilus/Bifid. animalis 32 billion cell capsule, Take 1 capsule by mouth once daily., Disp: , Rfl:     montelukast (Singulair) 10 mg tablet, Take 1 tablet (10 mg) by mouth once daily. As directed, Disp: 90 tablet, Rfl: 3    multivit-min-FA-lycopen-lutein (ESSENTIAL Man) 0.4-2-250 mg-mg-mcg tablet, Multi For Him Oral Tablet  Refills: 0     Active, Disp: , Rfl:     albuterol 90 mcg/actuation inhaler, Inhale 1-2 puffs. every 4-6 hours as needed for wheezing, Disp: , Rfl:      Tobacco Use: Medium Risk (12/11/2024)    Patient History     Smoking Tobacco Use: Former     Smokeless Tobacco Use: Never     Passive Exposure: Not on file        Alcohol Use: Not on file        Social History     Substance and Sexual Activity   Drug Use Yes    Types: Marijuana    Comment: edibles on occasion; last used 1 year ago        Physical  "Exam:  Visit Vitals  BP (!) 152/94   Temp 36.4 °C (97.5 °F) (Temporal)   Ht 1.727 m (5' 8\")   Wt 81.6 kg (179 lb 12.8 oz)   BMI 27.34 kg/m²   Smoking Status Former   BSA 1.98 m²      General: Patient is alert, oriented, cooperative in no apparent distress.  Head: Normocephalic, atraumatic.  Eyes: PERRL, EOMI, Conjunctiva is clear. No nystagmus.  Ears: Right Ear-- Pinna is normal.  External auditory canal is patent. Tympanic membrane is [intact, translucent and has good mobility with my pneumatic otoscope. No effusion].  Mastoid is nontender.  Left ear-- Pinna is normal.  External auditory canal is patent. Tympanic membrane is [intact, translucent and has good mobility with my pneumatic otoscope.  No effusion].  Mastoid is nontender.  Nose: Septum is severely deviated to the right pushing on the right lateral nasal wall with about 90% obstruction..  No septal perforation or lesions. No septal hematoma/ seroma.  No signs of bleeding.  Inferior turbinates are moderately swollen and obstructive left greater than right.   No evidence of intranasal polyps.  No infectious drainage.  Throat:  Floor of mouth is clear, no masses.  Tongue appears normal, no lesions or masses. Gums, gingiva, buccal mucosa appear pink and moist, no lesions. Teeth are in good repair.  No obvious dental infections.  Peritonsillar regions appear symmetric without swelling.  Hard and soft palate appear normal, no obvious cleft. Uvula is midline.  Oropharynx: No lesions. Retropharyngeal wall is flat.  No active postnasal drip.  Neck: Supple,  no lymphadenopathy.  No masses.  Salivary Glands: Symmetric bilaterally.  No palpable masses.  No evidence of acute infection or salivary stones  Neurologic: Cranial Nerves 2-12 are grossly intact without focal deficits. Cerebellar function testing is normal.   Cardiovascular: Heart regular rate rhythm without murmur  Lungs: Clear to auscultation bilaterally  Abdomen: Soft nontender bowel sounds present x " 4  Extremities: No clubbing cyanosis or edema    Results:   []    Procedure:   []    Georges Wilson, DO

## 2025-01-14 DIAGNOSIS — Z98.890 S/P NASAL SEPTOPLASTY: Primary | ICD-10-CM

## 2025-01-14 RX ORDER — OXYCODONE AND ACETAMINOPHEN 5; 325 MG/1; MG/1
1 TABLET ORAL EVERY 6 HOURS PRN
Qty: 28 TABLET | Refills: 0 | Status: SHIPPED | OUTPATIENT
Start: 2025-01-14 | End: 2025-01-21

## 2025-01-14 RX ORDER — CEPHALEXIN 500 MG/1
500 CAPSULE ORAL 2 TIMES DAILY
Qty: 14 CAPSULE | Refills: 0 | Status: SHIPPED | OUTPATIENT
Start: 2025-01-14 | End: 2025-01-21

## 2025-01-14 NOTE — H&P
Impression:  1. Deviated nasal septum          2. Hypertrophy of both inferior nasal turbinates                RECOMMENDATIONS/PLAN :  The various risks and benefits, complications and alternatives and expected course of recovery were explained to the patient and he would like to proceed with a nasal septoplasty with bilateral inferior turbinate Coblation/resection.  We will set this up in January at [facility].        **This electronic medical record note was created with the use of voice recognition software.  Despite proofreading, typographical or grammatical errors may be present that could affect meaning of content **     Subjective   Patient ID:      Mika Salamanca is a 49 y.o. male who presents to the office today as a checkup on his nose.  He has been flushing his nose and sinuses with a sinus rinse kit and using his Flonase for the last few months.  Unfortunately he still has difficulty breathing through his nose, worse on the right side.  He has had previous trauma to his nose and has not been able to breathe from that right side since.  He denies any infectious drainage fever or chills.     ROS:  A detailed 12 system review of systems is noted on the intake form has been reviewed with the patient with details noted in the HPI and scanned into the patient's medical record.     Objective           Past Medical History:   Diagnosis Date    Abdominal distension (gaseous) 03/21/2022     Abdominal bloating    Abnormal findings on diagnostic imaging of other abdominal regions, including retroperitoneum 04/04/2019     Abnormal CT of the abdomen    Asthma      Blood pressure elevated without history of HTN 10/05/2023    COVID-19 08/01/2022     COVID-19 virus infection    Generalized abdominal pain 03/15/2019     Generalized abdominal pain    Lumbago with sciatica, right side 03/09/2017     Acute right-sided low back pain with right-sided sciatica    Metatarsalgia, left foot 07/11/2014      Metatarsalgia of left foot    Myalgia, other site 04/14/2021     Gluteal pain    Other conditions influencing health status 02/22/2018     History of cough    Other general symptoms and signs 03/19/2019     Abnormal finding    Pain in right knee 03/09/2017     Acute pain of right knee    Pelvic and perineal pain 03/15/2019     Pelvic pain in male    Personal history of other diseases of male genital organs 08/08/2018     History of prostatitis    Personal history of other diseases of the digestive system 04/04/2019     History of ileus    Personal history of other diseases of the musculoskeletal system and connective tissue 03/15/2019     History of back pain    Personal history of other diseases of the respiratory system 09/20/2019     History of acute sinusitis    Personal history of other diseases of the respiratory system       History of asthma    Personal history of other diseases of the respiratory system       History of bronchitis    Personal history of other diseases of the respiratory system 04/20/2015     History of acute sinusitis    Personal history of other specified conditions 04/04/2019     History of abdominal pain    Personal history of other specified conditions 09/16/2019     History of fatigue    Right upper quadrant pain 02/10/2022     RUQ pain    Strain of muscle, fascia and tendon of the posterior muscle group at thigh level, left thigh, subsequent encounter 06/16/2021     Hamstring strain, left, subsequent encounter    Strain of muscle, fascia and tendon of the posterior muscle group at thigh level, right thigh, subsequent encounter 05/12/2021     Partial hamstring tear, right, subsequent encounter    Strain of muscle, fascia and tendon of the posterior muscle group at thigh level, unspecified thigh, initial encounter 04/13/2021     Hamstring tear    Strain of muscle, fascia and tendon of the posterior muscle group at thigh level, unspecified thigh, initial encounter 05/26/2021      Hamstring tear    Strain of other muscles, fascia and tendons at shoulder and upper arm level, right arm, initial encounter 01/21/2016     Strain of right trapezius muscle    Strain of unspecified muscle, fascia and tendon at shoulder and upper arm level, right arm, initial encounter 07/11/2014     Right shoulder strain    White coat syndrome without diagnosis of hypertension 03/14/2024               Past Surgical History:   Procedure Laterality Date    OTHER SURGICAL HISTORY   07/11/2014     Leg Repair    OTHER SURGICAL HISTORY   04/24/2019     Complete colonoscopy              Allergies   Allergen Reactions    Sulfamethoxazole-Trimethoprim Unknown            Current Outpatient Medications:     amLODIPine (Norvasc) 2.5 mg tablet, Take 1 tablet (2.5 mg) by mouth once daily., Disp: 90 tablet, Rfl: 3    diazePAM (Valium) 10 mg tablet, Take 1 tablet (10 mg) by mouth 2 times a day as needed for anxiety (vertigo)., Disp: 30 tablet, Rfl: 0    fluticasone (Flonase) 50 mcg/actuation nasal spray, Administer into affected nostril(s) once daily., Disp: , Rfl:     ibuprofen (Motrin IB) 200 mg tablet, Take 3 tablets (600 mg) by mouth every 6 hours if needed., Disp: , Rfl:     L. acidophilus/Bifid. animalis 32 billion cell capsule, Take 1 capsule by mouth once daily., Disp: , Rfl:     montelukast (Singulair) 10 mg tablet, Take 1 tablet (10 mg) by mouth once daily. As directed, Disp: 90 tablet, Rfl: 3    multivit-min-FA-lycopen-lutein (ESSENTIAL Man) 0.4-2-250 mg-mg-mcg tablet, Multi For Him Oral Tablet  Refills: 0     Active, Disp: , Rfl:     albuterol 90 mcg/actuation inhaler, Inhale 1-2 puffs. every 4-6 hours as needed for wheezing, Disp: , Rfl:            Tobacco Use: Medium Risk (12/11/2024)     Patient History      Smoking Tobacco Use: Former      Smokeless Tobacco Use: Never      Passive Exposure: Not on file         Alcohol Use: Not on file         Social History           Substance and Sexual Activity   Drug Use Yes     "Types: Marijuana     Comment: edibles on occasion; last used 1 year ago         Physical Exam:  Visit Vitals  BP (!) 152/94   Temp 36.4 °C (97.5 °F) (Temporal)   Ht 1.727 m (5' 8\")   Wt 81.6 kg (179 lb 12.8 oz)   BMI 27.34 kg/m²   Smoking Status Former   BSA 1.98 m²      General: Patient is alert, oriented, cooperative in no apparent distress.  Head: Normocephalic, atraumatic.  Eyes: PERRL, EOMI, Conjunctiva is clear. No nystagmus.  Ears: Right Ear-- Pinna is normal.  External auditory canal is patent. Tympanic membrane is [intact, translucent and has good mobility with my pneumatic otoscope. No effusion].  Mastoid is nontender.  Left ear-- Pinna is normal.  External auditory canal is patent. Tympanic membrane is [intact, translucent and has good mobility with my pneumatic otoscope.  No effusion].  Mastoid is nontender.  Nose: Septum is severely deviated to the right pushing on the right lateral nasal wall with about 90% obstruction..  No septal perforation or lesions. No septal hematoma/ seroma.  No signs of bleeding.  Inferior turbinates are moderately swollen and obstructive left greater than right.   No evidence of intranasal polyps.  No infectious drainage.  Throat:  Floor of mouth is clear, no masses.  Tongue appears normal, no lesions or masses. Gums, gingiva, buccal mucosa appear pink and moist, no lesions. Teeth are in good repair.  No obvious dental infections.  Peritonsillar regions appear symmetric without swelling.  Hard and soft palate appear normal, no obvious cleft. Uvula is midline.  Oropharynx: No lesions. Retropharyngeal wall is flat.  No active postnasal drip.  Neck: Supple,  no lymphadenopathy.  No masses.  Salivary Glands: Symmetric bilaterally.  No palpable masses.  No evidence of acute infection or salivary stones  Neurologic: Cranial Nerves 2-12 are grossly intact without focal deficits. Cerebellar function testing is normal.   Cardiovascular: Heart regular rate rhythm without murmur  Lungs: " Clear to auscultation bilaterally  Abdomen: Soft nontender bowel sounds present x 4  Extremities: No clubbing cyanosis or edema     Results:   []     Procedure:   []     Georges Wilson, DO

## 2025-01-27 ENCOUNTER — HOSPITAL ENCOUNTER (OUTPATIENT)
Facility: CLINIC | Age: 50
Setting detail: OUTPATIENT SURGERY
Discharge: HOME | End: 2025-01-27
Attending: OTOLARYNGOLOGY | Admitting: OTOLARYNGOLOGY
Payer: COMMERCIAL

## 2025-01-27 ENCOUNTER — ANESTHESIA (OUTPATIENT)
Dept: OPERATING ROOM | Facility: CLINIC | Age: 50
End: 2025-01-27
Payer: COMMERCIAL

## 2025-01-27 ENCOUNTER — ANESTHESIA EVENT (OUTPATIENT)
Dept: OPERATING ROOM | Facility: CLINIC | Age: 50
End: 2025-01-27
Payer: COMMERCIAL

## 2025-01-27 VITALS
HEIGHT: 68 IN | OXYGEN SATURATION: 94 % | RESPIRATION RATE: 16 BRPM | SYSTOLIC BLOOD PRESSURE: 152 MMHG | DIASTOLIC BLOOD PRESSURE: 92 MMHG | HEART RATE: 61 BPM | TEMPERATURE: 97 F | WEIGHT: 179.9 LBS | BODY MASS INDEX: 27.26 KG/M2

## 2025-01-27 DIAGNOSIS — J34.2 DEVIATED NASAL SEPTUM: Primary | ICD-10-CM

## 2025-01-27 DIAGNOSIS — Z98.890 S/P NASAL SEPTOPLASTY: Primary | ICD-10-CM

## 2025-01-27 DIAGNOSIS — Z98.890 S/P NASAL SEPTOPLASTY: ICD-10-CM

## 2025-01-27 PROCEDURE — 3600000002 HC OR TIME - INITIAL BASE CHARGE - PROCEDURE LEVEL TWO: Performed by: OTOLARYNGOLOGY

## 2025-01-27 PROCEDURE — A30520 PR REPAIR OF NASAL SEPTUM: Performed by: ANESTHESIOLOGIST ASSISTANT

## 2025-01-27 PROCEDURE — 2500000001 HC RX 250 WO HCPCS SELF ADMINISTERED DRUGS (ALT 637 FOR MEDICARE OP): Performed by: ANESTHESIOLOGY

## 2025-01-27 PROCEDURE — 3700000002 HC GENERAL ANESTHESIA TIME - EACH INCREMENTAL 1 MINUTE: Performed by: OTOLARYNGOLOGY

## 2025-01-27 PROCEDURE — 3700000001 HC GENERAL ANESTHESIA TIME - INITIAL BASE CHARGE: Performed by: OTOLARYNGOLOGY

## 2025-01-27 PROCEDURE — 7100000002 HC RECOVERY ROOM TIME - EACH INCREMENTAL 1 MINUTE: Performed by: OTOLARYNGOLOGY

## 2025-01-27 PROCEDURE — 2720000007 HC OR 272 NO HCPCS: Performed by: OTOLARYNGOLOGY

## 2025-01-27 PROCEDURE — 7100000001 HC RECOVERY ROOM TIME - INITIAL BASE CHARGE: Performed by: OTOLARYNGOLOGY

## 2025-01-27 PROCEDURE — 2500000004 HC RX 250 GENERAL PHARMACY W/ HCPCS (ALT 636 FOR OP/ED): Performed by: ANESTHESIOLOGY

## 2025-01-27 PROCEDURE — 2500000001 HC RX 250 WO HCPCS SELF ADMINISTERED DRUGS (ALT 637 FOR MEDICARE OP): Performed by: OTOLARYNGOLOGY

## 2025-01-27 PROCEDURE — 2500000005 HC RX 250 GENERAL PHARMACY W/O HCPCS: Performed by: OTOLARYNGOLOGY

## 2025-01-27 PROCEDURE — 2500000001 HC RX 250 WO HCPCS SELF ADMINISTERED DRUGS (ALT 637 FOR MEDICARE OP): Performed by: ANESTHESIOLOGIST ASSISTANT

## 2025-01-27 PROCEDURE — 7100000010 HC PHASE TWO TIME - EACH INCREMENTAL 1 MINUTE: Performed by: OTOLARYNGOLOGY

## 2025-01-27 PROCEDURE — A30520 PR REPAIR OF NASAL SEPTUM: Performed by: ANESTHESIOLOGY

## 2025-01-27 PROCEDURE — 7100000009 HC PHASE TWO TIME - INITIAL BASE CHARGE: Performed by: OTOLARYNGOLOGY

## 2025-01-27 PROCEDURE — 30802 ABLATE INF TURBINATE SUBMUC: CPT | Performed by: OTOLARYNGOLOGY

## 2025-01-27 PROCEDURE — 2500000004 HC RX 250 GENERAL PHARMACY W/ HCPCS (ALT 636 FOR OP/ED): Performed by: ANESTHESIOLOGIST ASSISTANT

## 2025-01-27 PROCEDURE — 30520 REPAIR OF NASAL SEPTUM: CPT | Performed by: OTOLARYNGOLOGY

## 2025-01-27 PROCEDURE — 2500000002 HC RX 250 W HCPCS SELF ADMINISTERED DRUGS (ALT 637 FOR MEDICARE OP, ALT 636 FOR OP/ED): Performed by: ANESTHESIOLOGIST ASSISTANT

## 2025-01-27 PROCEDURE — 3600000007 HC OR TIME - EACH INCREMENTAL 1 MINUTE - PROCEDURE LEVEL TWO: Performed by: OTOLARYNGOLOGY

## 2025-01-27 RX ORDER — APREPITANT 40 MG/1
CAPSULE ORAL AS NEEDED
Status: DISCONTINUED | OUTPATIENT
Start: 2025-01-27 | End: 2025-01-27

## 2025-01-27 RX ORDER — SUCCINYLCHOLINE CHLORIDE 100 MG/5ML
SYRINGE (ML) INTRAVENOUS AS NEEDED
Status: DISCONTINUED | OUTPATIENT
Start: 2025-01-27 | End: 2025-01-27

## 2025-01-27 RX ORDER — ALBUTEROL SULFATE 0.83 MG/ML
2.5 SOLUTION RESPIRATORY (INHALATION) ONCE AS NEEDED
Status: DISCONTINUED | OUTPATIENT
Start: 2025-01-27 | End: 2025-01-27 | Stop reason: HOSPADM

## 2025-01-27 RX ORDER — LIDOCAINE HYDROCHLORIDE 10 MG/ML
0.1 INJECTION, SOLUTION EPIDURAL; INFILTRATION; INTRACAUDAL; PERINEURAL ONCE
Status: DISCONTINUED | OUTPATIENT
Start: 2025-01-27 | End: 2025-01-27 | Stop reason: HOSPADM

## 2025-01-27 RX ORDER — OXYCODONE AND ACETAMINOPHEN 5; 325 MG/1; MG/1
1 TABLET ORAL EVERY 6 HOURS PRN
Qty: 20 TABLET | Refills: 0 | Status: SHIPPED | OUTPATIENT
Start: 2025-01-27 | End: 2025-02-01

## 2025-01-27 RX ORDER — BUPIVACAINE HYDROCHLORIDE AND EPINEPHRINE 5; 5 MG/ML; UG/ML
INJECTION, SOLUTION EPIDURAL; INTRACAUDAL; PERINEURAL AS NEEDED
Status: DISCONTINUED | OUTPATIENT
Start: 2025-01-27 | End: 2025-01-27 | Stop reason: HOSPADM

## 2025-01-27 RX ORDER — METOCLOPRAMIDE HYDROCHLORIDE 5 MG/ML
10 INJECTION INTRAMUSCULAR; INTRAVENOUS ONCE AS NEEDED
Status: DISCONTINUED | OUTPATIENT
Start: 2025-01-27 | End: 2025-01-27 | Stop reason: HOSPADM

## 2025-01-27 RX ORDER — GLYCOPYRROLATE 0.2 MG/ML
INJECTION INTRAMUSCULAR; INTRAVENOUS AS NEEDED
Status: DISCONTINUED | OUTPATIENT
Start: 2025-01-27 | End: 2025-01-27

## 2025-01-27 RX ORDER — MUPIROCIN 20 MG/G
OINTMENT TOPICAL AS NEEDED
Status: DISCONTINUED | OUTPATIENT
Start: 2025-01-27 | End: 2025-01-27 | Stop reason: HOSPADM

## 2025-01-27 RX ORDER — GABAPENTIN 300 MG/1
CAPSULE ORAL AS NEEDED
Status: DISCONTINUED | OUTPATIENT
Start: 2025-01-27 | End: 2025-01-27

## 2025-01-27 RX ORDER — MIDAZOLAM HYDROCHLORIDE 1 MG/ML
INJECTION, SOLUTION INTRAMUSCULAR; INTRAVENOUS AS NEEDED
Status: DISCONTINUED | OUTPATIENT
Start: 2025-01-27 | End: 2025-01-27

## 2025-01-27 RX ORDER — SODIUM CHLORIDE, SODIUM LACTATE, POTASSIUM CHLORIDE, CALCIUM CHLORIDE 600; 310; 30; 20 MG/100ML; MG/100ML; MG/100ML; MG/100ML
INJECTION, SOLUTION INTRAVENOUS CONTINUOUS PRN
Status: DISCONTINUED | OUTPATIENT
Start: 2025-01-27 | End: 2025-01-27

## 2025-01-27 RX ORDER — FENTANYL CITRATE 50 UG/ML
INJECTION, SOLUTION INTRAMUSCULAR; INTRAVENOUS AS NEEDED
Status: DISCONTINUED | OUTPATIENT
Start: 2025-01-27 | End: 2025-01-27

## 2025-01-27 RX ORDER — CEPHALEXIN 500 MG/1
500 CAPSULE ORAL 2 TIMES DAILY
Qty: 14 CAPSULE | Refills: 0 | Status: SHIPPED | OUTPATIENT
Start: 2025-01-27 | End: 2025-02-03

## 2025-01-27 RX ORDER — HYDROMORPHONE HYDROCHLORIDE 1 MG/ML
0.2 INJECTION, SOLUTION INTRAMUSCULAR; INTRAVENOUS; SUBCUTANEOUS EVERY 5 MIN PRN
Status: DISCONTINUED | OUTPATIENT
Start: 2025-01-27 | End: 2025-01-27 | Stop reason: HOSPADM

## 2025-01-27 RX ORDER — ACETAMINOPHEN 325 MG/1
TABLET ORAL AS NEEDED
Status: DISCONTINUED | OUTPATIENT
Start: 2025-01-27 | End: 2025-01-27

## 2025-01-27 RX ORDER — OXYCODONE HYDROCHLORIDE 5 MG/1
5 TABLET ORAL EVERY 4 HOURS PRN
Status: DISCONTINUED | OUTPATIENT
Start: 2025-01-27 | End: 2025-01-27 | Stop reason: HOSPADM

## 2025-01-27 RX ORDER — LIDOCAINE HYDROCHLORIDE 20 MG/ML
INJECTION, SOLUTION INFILTRATION; PERINEURAL AS NEEDED
Status: DISCONTINUED | OUTPATIENT
Start: 2025-01-27 | End: 2025-01-27

## 2025-01-27 RX ORDER — SODIUM CHLORIDE, SODIUM LACTATE, POTASSIUM CHLORIDE, CALCIUM CHLORIDE 600; 310; 30; 20 MG/100ML; MG/100ML; MG/100ML; MG/100ML
100 INJECTION, SOLUTION INTRAVENOUS CONTINUOUS
Status: DISCONTINUED | OUTPATIENT
Start: 2025-01-27 | End: 2025-01-27 | Stop reason: HOSPADM

## 2025-01-27 RX ORDER — SCOPOLAMINE 1 MG/3D
PATCH, EXTENDED RELEASE TRANSDERMAL AS NEEDED
Status: DISCONTINUED | OUTPATIENT
Start: 2025-01-27 | End: 2025-01-27

## 2025-01-27 RX ORDER — ONDANSETRON HYDROCHLORIDE 2 MG/ML
INJECTION, SOLUTION INTRAVENOUS AS NEEDED
Status: DISCONTINUED | OUTPATIENT
Start: 2025-01-27 | End: 2025-01-27

## 2025-01-27 RX ORDER — OXYMETAZOLINE HCL 0.05 %
SPRAY, NON-AEROSOL (ML) NASAL AS NEEDED
Status: DISCONTINUED | OUTPATIENT
Start: 2025-01-27 | End: 2025-01-27 | Stop reason: HOSPADM

## 2025-01-27 RX ORDER — PROPOFOL 10 MG/ML
INJECTION, EMULSION INTRAVENOUS AS NEEDED
Status: DISCONTINUED | OUTPATIENT
Start: 2025-01-27 | End: 2025-01-27

## 2025-01-27 RX ORDER — ONDANSETRON HYDROCHLORIDE 2 MG/ML
4 INJECTION, SOLUTION INTRAVENOUS ONCE AS NEEDED
Status: DISCONTINUED | OUTPATIENT
Start: 2025-01-27 | End: 2025-01-27 | Stop reason: HOSPADM

## 2025-01-27 RX ADMIN — OXYCODONE HYDROCHLORIDE 5 MG: 5 TABLET ORAL at 12:50

## 2025-01-27 RX ADMIN — SCOPALAMINE 1 PATCH: 1 PATCH, EXTENDED RELEASE TRANSDERMAL at 10:45

## 2025-01-27 RX ADMIN — FENTANYL CITRATE 100 MCG: 50 INJECTION, SOLUTION INTRAMUSCULAR; INTRAVENOUS at 10:58

## 2025-01-27 RX ADMIN — MIDAZOLAM HYDROCHLORIDE 2 MG: 1 INJECTION, SOLUTION INTRAMUSCULAR; INTRAVENOUS at 10:51

## 2025-01-27 RX ADMIN — Medication 80 MG: at 10:59

## 2025-01-27 RX ADMIN — GABAPENTIN 300 MG: 300 CAPSULE ORAL at 10:45

## 2025-01-27 RX ADMIN — ONDANSETRON 4 MG: 2 INJECTION INTRAMUSCULAR; INTRAVENOUS at 11:44

## 2025-01-27 RX ADMIN — ACETAMINOPHEN 975 MG: 325 TABLET, FILM COATED ORAL at 10:45

## 2025-01-27 RX ADMIN — DEXAMETHASONE SODIUM PHOSPHATE 4 MG: 4 INJECTION INTRA-ARTICULAR; INTRALESIONAL; INTRAMUSCULAR; INTRAVENOUS; SOFT TISSUE at 11:20

## 2025-01-27 RX ADMIN — LIDOCAINE HYDROCHLORIDE 100 MG: 20 INJECTION, SOLUTION INFILTRATION; PERINEURAL at 10:58

## 2025-01-27 RX ADMIN — HYDROMORPHONE HYDROCHLORIDE 0.2 MG: 1 INJECTION, SOLUTION INTRAMUSCULAR; INTRAVENOUS; SUBCUTANEOUS at 12:35

## 2025-01-27 RX ADMIN — SODIUM CHLORIDE, POTASSIUM CHLORIDE, SODIUM LACTATE AND CALCIUM CHLORIDE: 600; 310; 30; 20 INJECTION, SOLUTION INTRAVENOUS at 10:47

## 2025-01-27 RX ADMIN — PROPOFOL 200 MG: 10 INJECTION, EMULSION INTRAVENOUS at 10:58

## 2025-01-27 RX ADMIN — APREPITANT 40 MG: 40 CAPSULE ORAL at 10:45

## 2025-01-27 RX ADMIN — Medication 1 LOZENGE: at 12:50

## 2025-01-27 RX ADMIN — GLYCOPYRROLATE 0.1 MG: 0.2 INJECTION INTRAMUSCULAR; INTRAVENOUS at 10:51

## 2025-01-27 SDOH — HEALTH STABILITY: MENTAL HEALTH: CURRENT SMOKER: 0

## 2025-01-27 ASSESSMENT — PAIN - FUNCTIONAL ASSESSMENT
PAIN_FUNCTIONAL_ASSESSMENT: 0-10

## 2025-01-27 ASSESSMENT — COLUMBIA-SUICIDE SEVERITY RATING SCALE - C-SSRS
2. HAVE YOU ACTUALLY HAD ANY THOUGHTS OF KILLING YOURSELF?: NO
6. HAVE YOU EVER DONE ANYTHING, STARTED TO DO ANYTHING, OR PREPARED TO DO ANYTHING TO END YOUR LIFE?: NO
1. IN THE PAST MONTH, HAVE YOU WISHED YOU WERE DEAD OR WISHED YOU COULD GO TO SLEEP AND NOT WAKE UP?: NO

## 2025-01-27 ASSESSMENT — PAIN SCALES - GENERAL
PAINLEVEL_OUTOF10: 3
PAINLEVEL_OUTOF10: 6
PAINLEVEL_OUTOF10: 6
PAINLEVEL_OUTOF10: 0 - NO PAIN
PAINLEVEL_OUTOF10: 5 - MODERATE PAIN

## 2025-01-27 ASSESSMENT — PAIN DESCRIPTION - LOCATION: LOCATION: NOSE

## 2025-01-27 NOTE — ANESTHESIA POSTPROCEDURE EVALUATION
Patient: Mika Salamanca    Procedure Summary       Date: 01/27/25 Room / Location: [facility] OR 04 / Virtual [facility] OR    Anesthesia Start: 1053 Anesthesia Stop: 1206    Procedure: Septoplasty with bilateral inferior turbinate Coblation/resection (Bilateral) Diagnosis:       Deviated nasal septum      (Deviated nasal septum [J34.2])    Surgeons: Georges Wilson DO Responsible Provider: Girish Jackson MD    Anesthesia Type: general ASA Status: 2            Anesthesia Type: general    Vitals Value Taken Time   /91 01/27/25 1217   Temp 36.2 °C (97.2 °F) 01/27/25 1205   Pulse 59 01/27/25 1217   Resp 16 01/27/25 1217   SpO2 96 % 01/27/25 1217       Anesthesia Post Evaluation    Patient location during evaluation: PACU  Patient participation: complete - patient participated  Level of consciousness: awake  Pain management: satisfactory to patient  Multimodal analgesia pain management approach  Airway patency: patent  Cardiovascular status: acceptable  Respiratory status: acceptable  Hydration status: acceptable  Postoperative Nausea and Vomiting: none  Comments: Did well- has a sore throat from ET tube      There were no known notable events for this encounter.

## 2025-01-27 NOTE — ANESTHESIA PROCEDURE NOTES
Airway  Date/Time: 1/27/2025 11:01 AM  Urgency: elective    Airway not difficult    Staffing  Performed: IDALIA   Authorized by: Girish Jackson MD    Performed by: IDALIA Martines  Patient location during procedure: OR    Indications and Patient Condition  Indications for airway management: anesthesia  Spontaneous Ventilation: absent  Sedation level: deep  Preoxygenated: yes  Patient position: sniffing  MILS maintained throughout  Mask difficulty assessment: 1 - vent by mask  Planned trial extubation    Final Airway Details  Final airway type: endotracheal airway      Successful airway: BLAIR tube and ETT  Cuffed: yes   Successful intubation technique: direct laryngoscopy  Blade: Taoy  Blade size: #4  ETT size (mm): 7.5  Cormack-Lehane Classification: grade I - full view of glottis  Measured from: lips  ETT to lips (cm): 21  Number of attempts at approach: 1  Number of other approaches attempted: 0    Additional Comments  Lips/teeth in pre-anesthetic condition.

## 2025-01-27 NOTE — OP NOTE
Septoplasty with bilateral inferior turbinate Coblation/resection (B) Operative Note     Date: 2025  [facility]    Name: Mika Salamanca, : 1975, Age: 49 y.o., MRN: [id number], Sex: male    Diagnosis  Pre-op Diagnosis      * Deviated nasal septum [J34.2] Post-op Diagnosis     * Deviated nasal septum [J34.2]     Procedures  Septoplasty with bilateral inferior turbinate Coblation/resection  11746 - NJ SEPTOPLASTY/SUBMUCOUS RESECJ W/WO CARTILAGE GRF    NJ ABLTJ SOF TISS INF TURBS UNI/BI SUPFC INTRAMURAL [99000]  Surgeons      * Georges DOMINGUEZ Roof - Primary    Resident/Fellow/Other Assistant:  Surgeons and Role:  * No surgeons found with a matching role *    Staff:   Scrub Person: Ivana  Circulator: Alaina Warren Scrub: Elva Waldenub Person: Ivana    Anesthesia Staff: Anesthesiologist: Girish Jackson MD  C-AA: IDALIA Martines    Procedure Summary  Anesthesia: Anesthesia type not filed in the log.  ASA: ASA status not filed in the log.  Estimated Blood Loss: 15mL  Intra-op Medications:   Administrations occurring from 1100 to 1310 on 25:   Medication Name Total Dose   mupirocin (Bactroban) 2 % ointment 1 Application   oxymetazoline (Afrin) 0.05 % nasal spray 20 mL   BUPivacaine-EPINEPHrine (PF) (Marcaine w/EPI) 0.5 %-1:200,000 injection 5 mL   dexAMETHasone (Decadron) injection 4 mg/mL 4 mg   ondansetron 2 mg/mL 4 mg              Anesthesia Record               Intraprocedure I/O Totals          Output    Est. Blood Loss 15 mL    Total Output 15 mL          Specimen: No specimens collected              Drains and/or Catheters: * None in log *    Tourniquet Times:         Implants:     Findings: Severe deviated nasal septum to the right status post trauma with a fracture line down the middle of the septum.  Bilateral inferior turbinate hypertrophy left greater than right    Indications: Mika Salamanca is an 49 y.o. male who is having surgery for Deviated nasal  septum [J34.2].     The patient was seen in the preoperative area. The risks, benefits, complications, treatment options, non-operative alternatives, expected recovery and outcomes were discussed with the patient. The possibilities of reaction to medication, pulmonary aspiration, injury to surrounding structures, bleeding, recurrent infection, the need for additional procedures, failure to diagnose a condition, and creating a complication requiring transfusion or operation were discussed with the patient. The patient concurred with the proposed plan, giving informed consent.  The site of surgery was properly noted/marked if necessary per policy. The patient has been actively warmed in preoperative area. Preoperative antibiotics are not indicated. Venous thrombosis prophylaxis are not indicated.    Procedure Details:   After informed consent was obtained with the risk complications alternatives and expected course of recovery explained to the patient, he was taken to the operative suite and placed supinely on the operating room table.  A timeout was performed.  The patient was subsequently intubated and general anesthesia was achieved.  The nose was then decongested with cottonoids soaked in Afrin solution, 1 per nostril.  I then injected the caudal septum with 5 cc of half percent Marcaine 1-2 and thousand with epinephrine.  After allowing this to take effect the patient was subsequently prepped and draped in usual sterile fashion for major sinus case.  The cottonoids were then removed.  A left hemitransfixion incision was carried through the skin and subcutaneous tissue down to the mucoperichondrium.  I then carefully elevated a flap down the left side using a Conway elevator.  The septum was severely deviated to the right with a spur to the left.  I was able to carefully elevate the mucoperichondrium and the mucoperiosteum away from the septal cartilage and bone.  I then made a vertical incision through the  quadrangular cartilage using the freer elevator.  A flap was then elevated down the right side again elevating the mucoperichondrium and mucoperiosteum away from the septal cartilage and bone.  I then used my Arellano scissor to make a dorsal cut along the septum leaving a nice dorsal strut for nasal dorsal support.  Likewise I left a nice portion of caudal cartilage for nasal tip support.  I then sequentially removed deviated portions of septal cartilage and bone using the Vitaly forcep.  I then addressed the premaxilla and maxillary crest where there was a spur to the left.  This was also carefully removed.  Blood was suctioned from the flaps and I did leave a drain hole along that left side to prevent a postop hematoma/seroma.  The septal incision was then closed as a through and through whipstitch using a 4-0 plain gut on a small Delio needle back-and-forth as well as along the left hemitransfixion incision.  I then addressed the obstructive inferior turbinates where a small incision was made in each inferior turbinate and I advanced my reflex 45 wand into both inferior turbinates thus internally resecting and removing tissue.  I made 2 passes in the left inferior turbinate and 2 in the right.  A minor amount of bleeding was controlled using the hand-held suction cautery.  Blood was suctioned from the nasopharynx.  Nguyễn splints were then trimmed and coated with Bactroban ointment.  These were sewn in place using a 3-0 Ethilon suture.  Blood was suctioned from the mouth using a Yankauer and a nasal drip pad was applied.  The patient was subsequently turned back to anesthesia safely awakened extubated and transferred to the recovery room in stable condition.  There were no complications.  Complications:  None; patient tolerated the procedure well.    Disposition: PACU - hemodynamically stable.  Condition: stable                 Additional Details:     Attending Attestation:     Georges Wilson  Phone Number:  [phone]

## 2025-01-27 NOTE — DISCHARGE INSTRUCTIONS
Dr. Wilosn's Post Op Septoplasty Instructions    Rx:  Take Pain medication and antibiotics as directed.  Resume home medications as directed. Ok to take Tylenol or Motrin for mild pain.    Activity:   No strenuous activity, running, gym class, contact sports, or lifting over 10lbs for 14 days.  No nose blowing for 7 days.  May gently wipe the nose.  Ok to use a Q-tip with peroxide to remove any minor crusting just inside the nose.     Use saline nasal spray in the nose 4 times daily for 7 days.  Ok to use Afrin nasal spray in the nose 3 times daily for 4 days only.  Ok to use hydrogen peroxide on a Q-tip to gently remove minor crusting in the nose.  Cough or sneeze with the mouth open.  Ok to sleep upright on several pillows or in a recliner for several days.  Ok to change nasal drip pad as needed.  Ok to place frozen peas or ice packs on the cheeks, forehead or upper lip.  Do not drive or drink alcohol while taking the pain medication.    Do not completely immobilize yourself to the bed or chair.  You should be up and moving around the house and this will decrease the risk of blood clots.  Absolutely NO SMOKING or VAPING for 14 days.    Diet:  Regular diet.  Drink plenty of fluids to stay well hydrated.    Follow up:  As directed with Dr. Wilson in 7 days.  Call [phone] to confirm appointment time.  Call office directly with any problems or excessive bleeding.    Things to expect:  Congestion and nasal stuffiness is normal for 10-14 days.  Numbness or tingling of the nose/upper lip and roof of the mouth is common and may last a few months.    Mild pain is expected for 2 weeks.  Take Tylenol for mild/ moderate pain.  A minor amount of bleeding mixed with mucus is common.  Ok to gently wipe the nose but no nose blowing.  Use Afrin as directed above.  If the bleeding is excessive and won't stop, go to the nearest emergency room and they will contact the ENT on call.  Report any signs of infection such as excessive  swelling, redness, sudden increase in pain or pus like drainage or any fevers over 101.5(F)  Report development of any drug reaction immediately--such as rash, hives, itching or tongue swelling.  If you develop any shortness of breath or difficulty breathing, or tongue swelling, go to the nearest emergency room.  Scopalamine patch may stay on for 72 hrs.  Remove patch, discard away form pets, children.  Do not touch eyes!  Wash hands thoroughly    Tylenol taken at 10:45 am if needed may take again at 4:45 pm    Oxycodone given around 12:50 pm.  Next dose after 6:50 pm.

## 2025-01-27 NOTE — ANESTHESIA PREPROCEDURE EVALUATION
Patient: Mika Salamanca    Procedure Information       Anesthesia Start Date/Time: 01/27/25 1053    Procedure: Septoplasty with bilateral inferior turbinate Coblation/resection    Location: [facility] [place] 04 / [facility]    Surgeons: Georges Wilson, DO            Relevant Problems   Cardiac   (+) Benign essential hypertension      Pulmonary   (+) Asthma      Neuro   (+) Cervical radiculopathy      Musculoskeletal   (+) DJD (degenerative joint disease) of cervical spine   (+) Lumbar spondylosis      HEENT   (+) Nasal sinus congestion       Clinical information reviewed:   Tobacco  Allergies  Meds  Problems  Med Hx  Surg Hx   Fam Hx  Soc   Hx        NPO Detail:  NPO/Void Status  Date of Last Liquid: 01/27/25  Time of Last Liquid: 1000  Date of Last Solid: 01/26/25  Time of Last Solid: 1900  Last Intake Type: Clear fluids; Food  Time of Last Void: 1016         Physical Exam    Airway  Mallampati: II     Cardiovascular - normal exam  Rhythm: regular  Rate: normal     Dental - normal exam     Pulmonary - normal exam  Breath sounds clear to auscultation     Abdominal        Anesthesia Plan    History of general anesthesia?: yes  History of complications of general anesthesia?: no    ASA 2     general     The patient is not a current smoker.    intravenous induction   Anesthetic plan and risks discussed with patient and spouse.    Plan discussed with CAA.

## 2025-01-28 ENCOUNTER — APPOINTMENT (OUTPATIENT)
Dept: OTOLARYNGOLOGY | Facility: CLINIC | Age: 50
End: 2025-01-28
Payer: COMMERCIAL

## 2025-02-02 NOTE — PROGRESS NOTES
CHIEF COMPLAINT  Vertigo    HISTORY OF PRESENT ILLNESS  Mika Salamanca is a 49 y.o. male presents today for follow up of Vertigo    HPI    Recent deviated septum repair, doing well today, breathing is improved.  - had Percocet briefly after surgery, does not need anymore.    Vertigo has been relatively calm lately.  Does not need refill on diazepam today.  Checks BP at home and has been well controlled.     OARRS:  Yaquelin Roldan MD on 2/5/2025  7:45 AM  I have personally reviewed the OARRS report for Mika Salamanca. I have considered the risks of abuse, dependence, addiction and diversion and I believe that it is clinically appropriate for Mika Salamanca to be prescribed this medication    Is the patient prescribed a combination of a benzodiazepine and opioid?  No - see HPI, only had Percocet briefly for sinus surgery    Last Urine Drug Screen / ordered today: Yes  Recent Results (from the past 8760 hours)   Benzodiazepine Confirmation, Urine    Collection Time: 08/08/24  8:47 AM   Result Value Ref Range    Clonazepam <25 <25 ng/mL    7-Aminoclonazepam <25 <25 ng/mL    Alprazolam <25 <25 ng/mL    Alpha-Hydroxyalprazolam <25 <25 ng/mL    Midazolam <25 <25 ng/mL    Alpha-Hydroxymidazolam <25 <25 ng/mL    Chlordiazepoxide <25 <25 ng/mL    Diazepam <25 <25 ng/mL    Nordiazepam 252 (H) <25 ng/mL    Temazepam 481 (H) <25 ng/mL    Oxazepam 381 (H) <25 ng/mL    Lorazepam <25 <25 ng/mL   Drug Screen, Urine With Reflex to Confirmation    Collection Time: 08/08/24  8:47 AM   Result Value Ref Range    Amphetamine Screen, Urine Presumptive Negative Presumptive Negative    Barbiturate Screen, Urine Presumptive Negative Presumptive Negative    Benzodiazepines Screen, Urine Presumptive Positive (A) Presumptive Negative    Cannabinoid Screen, Urine Presumptive Negative Presumptive Negative    Cocaine Metabolite Screen, Urine Presumptive Negative Presumptive Negative    Fentanyl Screen, Urine Presumptive  Negative Presumptive Negative    Opiate Screen, Urine Presumptive Negative Presumptive Negative    Oxycodone Screen, Urine Presumptive Negative Presumptive Negative    PCP Screen, Urine Presumptive Negative Presumptive Negative    Methadone Screen, Urine Presumptive Negative Presumptive Negative         Controlled Substance Agreement:  Date of the Last Agreement: 2/5/2025  Reviewed Controlled Substance Agreement including but not limited to the benefits, risks, and alternatives to treatment with a Controlled Substance medication(s).    Benzodiazepines:  What is the patient's goal of therapy? Relief of episodic vertigo   Is this being achieved with current treatment? yes    Activities of Daily Living:   Is your overall impression that this patient is benefiting (symptom reduction outweighs side effects) from benzodiazepine therapy? Yes     1. Physical Functioning: Better  2. Family Relationship: Better  3. Social Relationship: Better  4. Mood: Better  5. Sleep Patterns: Better  6. Overall Function: Better    REVIEW OF SYSTEMS  Review of Systems   Constitutional:  Negative for chills, fatigue and fever.   Respiratory:  Negative for cough and shortness of breath.    Cardiovascular:  Negative for chest pain, palpitations and leg swelling.   Skin:  Negative for rash.   Neurological:  Positive for dizziness.        Intermittent vertigo   Psychiatric/Behavioral:  Negative for dysphoric mood. The patient is not nervous/anxious.        ALLERGIES  Sulfamethoxazole-trimethoprim    MEDICATIONS  Current Outpatient Medications   Medication Instructions    amLODIPine (NORVASC) 2.5 mg, oral, Daily    L. acidophilus/Bifid. animalis 32 billion cell capsule Take 1 capsule by mouth once daily.    montelukast (SINGULAIR) 10 mg, oral, Daily, As directed    multivit-min-FA-lycopen-lutein (ESSENTIAL Man) 0.4-2-250 mg-mg-mcg tablet Multi For Him Oral Tablet   Refills: 0       Active    oxyCODONE-acetaminophen (Percocet) 5-325 mg tablet 1  "tablet, oral, Every 6 hours PRN       TOBACCO USE  Social History     Tobacco Use   Smoking Status Former    Types: Cigarettes   Smokeless Tobacco Never       DEPRESSION SCREEN  Over the past 2 weeks, how often have you been bothered by any of the following problems?  Little interest or pleasure in doing things: Not at all  Feeling down, depressed, or hopeless: Not at all    SURGICAL HISTORY  Past Surgical History:  07/11/2014: OTHER SURGICAL HISTORY      Comment:  Leg Repair  04/24/2019: OTHER SURGICAL HISTORY      Comment:  Complete colonoscopy  No date: ROTATOR CUFF REPAIR       OBJECTIVE    BP (!) 139/92   Pulse 61   Temp 36.1 °C (96.9 °F)   Ht 1.727 m (5' 8\")   Wt 80.2 kg (176 lb 11.2 oz)   SpO2 100%   BMI 26.87 kg/m²    BMI: Estimated body mass index is 26.87 kg/m² as calculated from the following:    Height as of this encounter: 1.727 m (5' 8\").    Weight as of this encounter: 80.2 kg (176 lb 11.2 oz).    BP Readings from Last 3 Encounters:   02/05/25 (!) 139/92   01/27/25 (!) 152/92   12/11/24 (!) 152/94      Wt Readings from Last 3 Encounters:   02/05/25 80.2 kg (176 lb 11.2 oz)   01/27/25 81.6 kg (179 lb 14.3 oz)   12/11/24 81.6 kg (179 lb 12.8 oz)        PHYSICAL EXAM  Physical Exam  Constitutional:       Appearance: Normal appearance.   HENT:      Head: Normocephalic and atraumatic.   Cardiovascular:      Rate and Rhythm: Normal rate and regular rhythm.      Heart sounds: No murmur heard.  Pulmonary:      Effort: Pulmonary effort is normal. No respiratory distress.      Breath sounds: Normal breath sounds. No wheezing.   Neurological:      General: No focal deficit present.      Mental Status: He is alert and oriented to person, place, and time. Mental status is at baseline.      Gait: Gait normal.   Psychiatric:         Mood and Affect: Mood normal.         Behavior: Behavior normal.         Thought Content: Thought content normal.         Judgment: Judgment normal.          ASSESSMENT AND " PLAN  Assessment/Plan   Problem List Items Addressed This Visit       Asthma    Overview     Uses albuterol PRN         Chronic prescription benzodiazepine use    Vertigo - Primary    Benign essential hypertension     BPPV - has been on diazepam PRN for many years good good results.  CSA was updated 2/5/25.  OARRS reviewed.   UDS 8/8/24.  Does not require refill today.  Continue follow-up every 90 days.     Hypertension - Controlled per home monitoring.  Continue current medication.  Continue with healthy habits as able.

## 2025-02-03 ENCOUNTER — APPOINTMENT (OUTPATIENT)
Facility: CLINIC | Age: 50
End: 2025-02-03
Payer: COMMERCIAL

## 2025-02-03 VITALS — BODY MASS INDEX: 27.35 KG/M2 | HEIGHT: 68 IN

## 2025-02-03 DIAGNOSIS — Z98.890 STATUS POST NASAL SEPTOPLASTY: Primary | ICD-10-CM

## 2025-02-03 PROCEDURE — 1036F TOBACCO NON-USER: CPT | Performed by: OTOLARYNGOLOGY

## 2025-02-03 PROCEDURE — 99024 POSTOP FOLLOW-UP VISIT: CPT | Performed by: OTOLARYNGOLOGY

## 2025-02-03 NOTE — PROGRESS NOTES
Impression:              1. Status post nasal septoplasty             Recommendations/Plan:  After removing his Nguyễn splints, the patient was breathing excellent through his nose.  He can gently blow his nose and slowly resume his normal activities.  He will continue to flush his nose using saline rinses and we will restart his Flonase nasal spray-2 puffs each nostril daily.    **This electronic medical record note was created with the use of voice recognition software.  Despite proofreading, typographical or grammatical errors may be present that could affect meaning of content **    Subjective:  Mika returns to the office today for his first postoperative visit after his recent septoplasty with bilateral inferior turbinate Coblation/resection.  Overall his pain has been well-controlled.  He denies any high fever chills or pus draining from his nose.    Objective:    There were no vitals taken for this visit.     Current Outpatient Medications   Medication Instructions    amLODIPine (NORVASC) 2.5 mg, oral, Daily    cephalexin (KEFLEX) 500 mg, oral, 2 times daily    L. acidophilus/Bifid. animalis 32 billion cell capsule Take 1 capsule by mouth once daily.    montelukast (SINGULAIR) 10 mg, oral, Daily, As directed    multivit-min-FA-lycopen-lutein (ESSENTIAL Man) 0.4-2-250 mg-mg-mcg tablet Multi For Him Oral Tablet   Refills: 0       Active        Allergies   Allergen Reactions    Sulfamethoxazole-Trimethoprim Unknown        Physical Exam:  Nose-after removing his Nguyễn splints, his septum is nice and straight.  Incision is healing nicely.  Turbinates continue to shrink down nicely.  No evidence of infection.    Results:  []    Procedure:  []    Georges Wilson DO

## 2025-02-05 ENCOUNTER — APPOINTMENT (OUTPATIENT)
Dept: PRIMARY CARE | Facility: CLINIC | Age: 50
End: 2025-02-05
Payer: COMMERCIAL

## 2025-02-05 VITALS
HEART RATE: 61 BPM | OXYGEN SATURATION: 100 % | TEMPERATURE: 96.9 F | BODY MASS INDEX: 26.78 KG/M2 | HEIGHT: 68 IN | DIASTOLIC BLOOD PRESSURE: 92 MMHG | SYSTOLIC BLOOD PRESSURE: 139 MMHG | WEIGHT: 176.7 LBS

## 2025-02-05 DIAGNOSIS — R42 VERTIGO: Primary | ICD-10-CM

## 2025-02-05 DIAGNOSIS — Z79.899 CHRONIC PRESCRIPTION BENZODIAZEPINE USE: ICD-10-CM

## 2025-02-05 DIAGNOSIS — I10 BENIGN ESSENTIAL HYPERTENSION: ICD-10-CM

## 2025-02-05 DIAGNOSIS — J45.20 MILD INTERMITTENT ASTHMA WITHOUT COMPLICATION (HHS-HCC): ICD-10-CM

## 2025-02-05 PROCEDURE — 99213 OFFICE O/P EST LOW 20 MIN: CPT | Performed by: INTERNAL MEDICINE

## 2025-02-05 PROCEDURE — 3008F BODY MASS INDEX DOCD: CPT | Performed by: INTERNAL MEDICINE

## 2025-02-05 PROCEDURE — 3080F DIAST BP >= 90 MM HG: CPT | Performed by: INTERNAL MEDICINE

## 2025-02-05 PROCEDURE — 3075F SYST BP GE 130 - 139MM HG: CPT | Performed by: INTERNAL MEDICINE

## 2025-02-05 PROCEDURE — 1036F TOBACCO NON-USER: CPT | Performed by: INTERNAL MEDICINE

## 2025-02-05 ASSESSMENT — ENCOUNTER SYMPTOMS
DIZZINESS: 1
SHORTNESS OF BREATH: 0
NERVOUS/ANXIOUS: 0
CHILLS: 0
PALPITATIONS: 0
FATIGUE: 0
DYSPHORIC MOOD: 0
COUGH: 0
FEVER: 0

## 2025-04-07 ENCOUNTER — TELEPHONE (OUTPATIENT)
Dept: PRIMARY CARE | Facility: CLINIC | Age: 50
End: 2025-04-07
Payer: COMMERCIAL

## 2025-04-07 DIAGNOSIS — H81.10 BENIGN PAROXYSMAL POSITIONAL VERTIGO, UNSPECIFIED LATERALITY: ICD-10-CM

## 2025-04-07 RX ORDER — DIAZEPAM 10 MG/1
10 TABLET ORAL 2 TIMES DAILY PRN
Qty: 30 TABLET | Refills: 0 | Status: SHIPPED | OUTPATIENT
Start: 2025-04-07

## 2025-05-07 ENCOUNTER — APPOINTMENT (OUTPATIENT)
Dept: PRIMARY CARE | Facility: CLINIC | Age: 50
End: 2025-05-07
Payer: COMMERCIAL

## 2025-05-07 VITALS
HEART RATE: 68 BPM | SYSTOLIC BLOOD PRESSURE: 135 MMHG | BODY MASS INDEX: 27.25 KG/M2 | TEMPERATURE: 97.2 F | DIASTOLIC BLOOD PRESSURE: 90 MMHG | WEIGHT: 179.8 LBS | OXYGEN SATURATION: 99 % | HEIGHT: 68 IN

## 2025-05-07 DIAGNOSIS — Z79.899 CHRONIC PRESCRIPTION BENZODIAZEPINE USE: ICD-10-CM

## 2025-05-07 DIAGNOSIS — Z00.00 HEALTHCARE MAINTENANCE: ICD-10-CM

## 2025-05-07 DIAGNOSIS — I10 BENIGN ESSENTIAL HYPERTENSION: Chronic | ICD-10-CM

## 2025-05-07 DIAGNOSIS — R42 VERTIGO: Primary | Chronic | ICD-10-CM

## 2025-05-07 PROCEDURE — 3075F SYST BP GE 130 - 139MM HG: CPT | Performed by: INTERNAL MEDICINE

## 2025-05-07 PROCEDURE — 1036F TOBACCO NON-USER: CPT | Performed by: INTERNAL MEDICINE

## 2025-05-07 PROCEDURE — 3080F DIAST BP >= 90 MM HG: CPT | Performed by: INTERNAL MEDICINE

## 2025-05-07 PROCEDURE — 3008F BODY MASS INDEX DOCD: CPT | Performed by: INTERNAL MEDICINE

## 2025-05-07 PROCEDURE — 99214 OFFICE O/P EST MOD 30 MIN: CPT | Performed by: INTERNAL MEDICINE

## 2025-05-07 RX ORDER — AMLODIPINE BESYLATE 5 MG/1
5 TABLET ORAL DAILY
Qty: 90 TABLET | Refills: 3 | Status: SHIPPED | OUTPATIENT
Start: 2025-05-07 | End: 2026-05-07

## 2025-05-07 ASSESSMENT — ENCOUNTER SYMPTOMS
CHILLS: 0
PALPITATIONS: 0
FATIGUE: 0
FEVER: 0
DIZZINESS: 1
NERVOUS/ANXIOUS: 0
DYSPHORIC MOOD: 0

## 2025-05-07 NOTE — PROGRESS NOTES
CHIEF COMPLAINT  Vertigo    HISTORY OF PRESENT ILLNESS  Mika Salamanca is a 49 y.o. male presents today for follow up of Vertigo    HPI    Exercises regularly and follows healthy diet.  Checks BP at home - diastolic remains moderately elevated.  He is interested in increasing amlodipine.    Vertigo is controlled with diazepam PRN.  Does not need refill today.    Allergies/ asthma flaring up.  Benadryl helps but too sedating; second gen antihistamines not effective for him.     Last fill 4/7/25 diazepam 10 mg, #30    OARRS:  Yaquelin Roldan MD on 5/7/2025  7:52 AM  I have personally reviewed the OARRS report for Mika Salamanca. I have considered the risks of abuse, dependence, addiction and diversion and I believe that it is clinically appropriate for Mika Salamanca to be prescribed this medication    Is the patient prescribed a combination of a benzodiazepine and opioid?  No    Last Urine Drug Screen / ordered today: No  Recent Results (from the past 8760 hours)   Benzodiazepine Confirmation, Urine    Collection Time: 08/08/24  8:47 AM   Result Value Ref Range    Clonazepam <25 <25 ng/mL    7-Aminoclonazepam <25 <25 ng/mL    Alprazolam <25 <25 ng/mL    Alpha-Hydroxyalprazolam <25 <25 ng/mL    Midazolam <25 <25 ng/mL    Alpha-Hydroxymidazolam <25 <25 ng/mL    Chlordiazepoxide <25 <25 ng/mL    Diazepam <25 <25 ng/mL    Nordiazepam 252 (H) <25 ng/mL    Temazepam 481 (H) <25 ng/mL    Oxazepam 381 (H) <25 ng/mL    Lorazepam <25 <25 ng/mL   Drug Screen, Urine With Reflex to Confirmation    Collection Time: 08/08/24  8:47 AM   Result Value Ref Range    Amphetamine Screen, Urine Presumptive Negative Presumptive Negative    Barbiturate Screen, Urine Presumptive Negative Presumptive Negative    Benzodiazepines Screen, Urine Presumptive Positive (A) Presumptive Negative    Cannabinoid Screen, Urine Presumptive Negative Presumptive Negative    Cocaine Metabolite Screen, Urine Presumptive Negative  Presumptive Negative    Fentanyl Screen, Urine Presumptive Negative Presumptive Negative    Opiate Screen, Urine Presumptive Negative Presumptive Negative    Oxycodone Screen, Urine Presumptive Negative Presumptive Negative    PCP Screen, Urine Presumptive Negative Presumptive Negative    Methadone Screen, Urine Presumptive Negative Presumptive Negative     Results are as expected.         Controlled Substance Agreement:  Date of the Last Agreement: 2/5/25  Reviewed Controlled Substance Agreement including but not limited to the benefits, risks, and alternatives to treatment with a Controlled Substance medication(s).    Benzodiazepines:  What is the patient's goal of therapy? Relief of vertigo   Is this being achieved with current treatment? yes      Activities of Daily Living:   Is your overall impression that this patient is benefiting (symptom reduction outweighs side effects) from benzodiazepine therapy? Yes     1. Physical Functioning: Better  2. Family Relationship: Better  3. Social Relationship: Better  4. Mood: Better  5. Sleep Patterns: Better  6. Overall Function: Better    REVIEW OF SYSTEMS  Review of Systems   Constitutional:  Negative for chills, fatigue and fever.   Cardiovascular:  Negative for chest pain, palpitations and leg swelling.   Skin:  Negative for rash.   Neurological:  Positive for dizziness.        Intermittent vertigo   Psychiatric/Behavioral:  Negative for dysphoric mood. The patient is not nervous/anxious.        ALLERGIES  Sulfamethoxazole-trimethoprim    MEDICATIONS  Current Outpatient Medications   Medication Instructions    amLODIPine (NORVASC) 5 mg, oral, Daily    diazePAM (VALIUM) 10 mg, oral, 2 times daily PRN    L. acidophilus/Bifid. animalis 32 billion cell capsule Take 1 capsule by mouth once daily.    montelukast (SINGULAIR) 10 mg, oral, Daily, As directed    multivit-min-FA-lycopen-lutein (ESSENTIAL Man) 0.4-2-250 mg-mg-mcg tablet Multi For Him Oral Tablet   Refills: 0  "      Active       TOBACCO USE  Tobacco Use History[1]    DEPRESSION SCREEN  Over the past 2 weeks, how often have you been bothered by any of the following problems?  Little interest or pleasure in doing things: Not at all  Feeling down, depressed, or hopeless: Not at all    SURGICAL HISTORY  Past Surgical History:  07/11/2014: OTHER SURGICAL HISTORY      Comment:  Leg Repair  04/24/2019: OTHER SURGICAL HISTORY      Comment:  Complete colonoscopy  No date: ROTATOR CUFF REPAIR       OBJECTIVE    /90   Pulse 68   Temp 36.2 °C (97.2 °F)   Ht 1.727 m (5' 8\")   Wt 81.6 kg (179 lb 12.8 oz)   SpO2 99%   BMI 27.34 kg/m²    BMI: Estimated body mass index is 27.34 kg/m² as calculated from the following:    Height as of this encounter: 1.727 m (5' 8\").    Weight as of this encounter: 81.6 kg (179 lb 12.8 oz).    BP Readings from Last 3 Encounters:   05/07/25 135/90   02/05/25 (!) 139/92   01/27/25 (!) 152/92      Wt Readings from Last 3 Encounters:   05/07/25 81.6 kg (179 lb 12.8 oz)   02/05/25 80.2 kg (176 lb 11.2 oz)   01/27/25 81.6 kg (179 lb 14.3 oz)        PHYSICAL EXAM  Physical Exam  Constitutional:       Appearance: Normal appearance.   HENT:      Head: Normocephalic and atraumatic.   Cardiovascular:      Rate and Rhythm: Normal rate and regular rhythm.      Heart sounds: No murmur heard.  Neurological:      General: No focal deficit present.      Mental Status: He is alert and oriented to person, place, and time.   Psychiatric:         Mood and Affect: Mood normal.         Behavior: Behavior normal.          ASSESSMENT AND PLAN  Assessment/Plan   Problem List Items Addressed This Visit       Chronic prescription benzodiazepine use    Vertigo - Primary    Benign essential hypertension    Relevant Medications    amLODIPine (Norvasc) 5 mg tablet     Other Visit Diagnoses         Healthcare maintenance        Relevant Orders    Lipid Panel    Comprehensive Metabolic Panel    CBC and Auto Differential      "     BPPV - has been on diazepam PRN for many years good good results.  CSA was updated 2/5/25.  OARRS reviewed.   UDS 8/8/24.  Does not require refill today.  Continue follow-up every 90 days.     Hypertension - diastolic remains elevated.  Increase amlodipine to 5 mg daily.  Continue healthy habits.    Routine fasting labs today.           [1]   Social History  Tobacco Use   Smoking Status Former    Types: Cigarettes   Smokeless Tobacco Never

## 2025-05-08 LAB
ALBUMIN SERPL-MCNC: 4.7 G/DL (ref 3.6–5.1)
ALP SERPL-CCNC: 40 U/L (ref 36–130)
ALT SERPL-CCNC: 23 U/L (ref 9–46)
ANION GAP SERPL CALCULATED.4IONS-SCNC: 9 MMOL/L (CALC) (ref 7–17)
AST SERPL-CCNC: 23 U/L (ref 10–40)
BASOPHILS # BLD AUTO: 100 CELLS/UL (ref 0–200)
BASOPHILS NFR BLD AUTO: 2 %
BILIRUB SERPL-MCNC: 0.9 MG/DL (ref 0.2–1.2)
BUN SERPL-MCNC: 15 MG/DL (ref 7–25)
CALCIUM SERPL-MCNC: 9.7 MG/DL (ref 8.6–10.3)
CHLORIDE SERPL-SCNC: 103 MMOL/L (ref 98–110)
CHOLEST SERPL-MCNC: 202 MG/DL
CHOLEST/HDLC SERPL: 3 (CALC)
CO2 SERPL-SCNC: 27 MMOL/L (ref 20–32)
CREAT SERPL-MCNC: 0.97 MG/DL (ref 0.6–1.29)
EGFRCR SERPLBLD CKD-EPI 2021: 97 ML/MIN/1.73M2
EOSINOPHIL # BLD AUTO: 90 CELLS/UL (ref 15–500)
EOSINOPHIL NFR BLD AUTO: 1.8 %
ERYTHROCYTE [DISTWIDTH] IN BLOOD BY AUTOMATED COUNT: 12.6 % (ref 11–15)
GLUCOSE SERPL-MCNC: 114 MG/DL (ref 65–99)
HCT VFR BLD AUTO: 43.3 % (ref 38.5–50)
HDLC SERPL-MCNC: 67 MG/DL
HGB BLD-MCNC: 14.1 G/DL (ref 13.2–17.1)
LDLC SERPL CALC-MCNC: 118 MG/DL (CALC)
LYMPHOCYTES # BLD AUTO: 1210 CELLS/UL (ref 850–3900)
LYMPHOCYTES NFR BLD AUTO: 24.2 %
MCH RBC QN AUTO: 29.3 PG (ref 27–33)
MCHC RBC AUTO-ENTMCNC: 32.6 G/DL (ref 32–36)
MCV RBC AUTO: 89.8 FL (ref 80–100)
MONOCYTES # BLD AUTO: 640 CELLS/UL (ref 200–950)
MONOCYTES NFR BLD AUTO: 12.8 %
NEUTROPHILS # BLD AUTO: 2960 CELLS/UL (ref 1500–7800)
NEUTROPHILS NFR BLD AUTO: 59.2 %
NONHDLC SERPL-MCNC: 135 MG/DL (CALC)
PLATELET # BLD AUTO: 260 THOUSAND/UL (ref 140–400)
PMV BLD REES-ECKER: 9.4 FL (ref 7.5–12.5)
POTASSIUM SERPL-SCNC: 4.3 MMOL/L (ref 3.5–5.3)
PROT SERPL-MCNC: 7.1 G/DL (ref 6.1–8.1)
RBC # BLD AUTO: 4.82 MILLION/UL (ref 4.2–5.8)
SODIUM SERPL-SCNC: 139 MMOL/L (ref 135–146)
TRIGL SERPL-MCNC: 75 MG/DL
WBC # BLD AUTO: 5 THOUSAND/UL (ref 3.8–10.8)

## 2025-07-22 ENCOUNTER — TELEPHONE (OUTPATIENT)
Dept: PRIMARY CARE | Facility: CLINIC | Age: 50
End: 2025-07-22
Payer: COMMERCIAL

## 2025-07-22 DIAGNOSIS — H81.10 BENIGN PAROXYSMAL POSITIONAL VERTIGO, UNSPECIFIED LATERALITY: ICD-10-CM

## 2025-07-22 RX ORDER — DIAZEPAM 10 MG/1
10 TABLET ORAL 2 TIMES DAILY PRN
Qty: 30 TABLET | Refills: 0 | Status: SHIPPED | OUTPATIENT
Start: 2025-07-22

## 2025-08-12 ENCOUNTER — APPOINTMENT (OUTPATIENT)
Dept: PRIMARY CARE | Facility: CLINIC | Age: 50
End: 2025-08-12
Payer: COMMERCIAL

## 2025-08-12 VITALS
HEIGHT: 68 IN | BODY MASS INDEX: 25.98 KG/M2 | WEIGHT: 171.4 LBS | DIASTOLIC BLOOD PRESSURE: 85 MMHG | TEMPERATURE: 96.4 F | HEART RATE: 62 BPM | OXYGEN SATURATION: 98 % | SYSTOLIC BLOOD PRESSURE: 136 MMHG

## 2025-08-12 DIAGNOSIS — H81.10 BENIGN PAROXYSMAL POSITIONAL VERTIGO, UNSPECIFIED LATERALITY: Primary | ICD-10-CM

## 2025-08-12 DIAGNOSIS — I10 BENIGN ESSENTIAL HYPERTENSION: ICD-10-CM

## 2025-08-12 DIAGNOSIS — Z79.899 CHRONIC PRESCRIPTION BENZODIAZEPINE USE: ICD-10-CM

## 2025-08-12 PROCEDURE — 3008F BODY MASS INDEX DOCD: CPT | Performed by: INTERNAL MEDICINE

## 2025-08-12 PROCEDURE — 3079F DIAST BP 80-89 MM HG: CPT | Performed by: INTERNAL MEDICINE

## 2025-08-12 PROCEDURE — 3075F SYST BP GE 130 - 139MM HG: CPT | Performed by: INTERNAL MEDICINE

## 2025-08-12 PROCEDURE — 99213 OFFICE O/P EST LOW 20 MIN: CPT | Performed by: INTERNAL MEDICINE

## 2025-08-12 ASSESSMENT — ENCOUNTER SYMPTOMS
DIZZINESS: 1
FEVER: 0
FATIGUE: 0
CHILLS: 0
DYSPHORIC MOOD: 0
MYALGIAS: 1
PALPITATIONS: 0
NERVOUS/ANXIOUS: 0

## 2025-08-14 LAB
1OH-MIDAZOLAM UR-MCNC: NEGATIVE NG/ML
7AMINOCLONAZEPAM UR-MCNC: NEGATIVE NG/ML
A-OH ALPRAZ UR-MCNC: NEGATIVE NG/ML
A-OH-TRIAZOLAM UR-MCNC: NEGATIVE NG/ML
AMPHETAMINES UR QL: NEGATIVE NG/ML
BARBITURATES UR QL: NEGATIVE NG/ML
BENZODIAZ UR QL: POSITIVE NG/ML
BZE UR QL: NEGATIVE NG/ML
CREAT UR-MCNC: 217.9 MG/DL
DRUG SCREEN COMMENT UR-IMP: ABNORMAL
FENTANYL UR QL SCN: NEGATIVE NG/ML
LORAZEPAM UR-MCNC: NEGATIVE NG/ML
METHADONE UR QL: NEGATIVE NG/ML
NORDIAZEPAM UR-MCNC: 557 NG/ML
OH-ETHYLFLURAZ UR-MCNC: NEGATIVE NG/ML
OPIATES UR QL: NEGATIVE NG/ML
OXAZEPAM UR-MCNC: 659 NG/ML
OXIDANTS UR QL: NEGATIVE MCG/ML
OXYCODONE UR QL: NEGATIVE NG/ML
PCP UR QL: NEGATIVE NG/ML
PH UR: 5.7 [PH] (ref 4.5–9)
QUEST NOTES AND COMMENTS: ABNORMAL
TEMAZEPAM UR-MCNC: 968 NG/ML
THC UR QL: NEGATIVE NG/ML

## 2025-11-11 ENCOUNTER — APPOINTMENT (OUTPATIENT)
Dept: PRIMARY CARE | Facility: CLINIC | Age: 50
End: 2025-11-11
Payer: COMMERCIAL

## (undated) DEVICE — Device

## (undated) DEVICE — SUTURE, VICRYL, 2-0, 27 IN, SH, UNDYED

## (undated) DEVICE — DRESSING, GAUZE, PETROLATUM, PATCH, XEROFORM, 1 X 8 IN, STERILE

## (undated) DEVICE — TUBING, SUCTION, CONNECTING, STERILE 0.25 X 120 IN., LF

## (undated) DEVICE — GLOVE, SURGICAL, PROTEXIS PI ORTHO, 7.5, PF, LF

## (undated) DEVICE — TAPE, PAPER, SURGICAL, MICROPORE, 1/2 IN X 10 YD, LF

## (undated) DEVICE — SUCTION, COAGULATOR, 10FR

## (undated) DEVICE — SUTURE, PLAIN GUT, 4-0, 18 IN, PL SC1, 45CM

## (undated) DEVICE — DRESSING, ABDOMINAL, WET PRUF, TENDERSORB, 5 X 9 IN, STERILE

## (undated) DEVICE — GLOVE, SURGICAL, PROTEXIS PI ORTHO, 8.0, PF, LF

## (undated) DEVICE — WAND, COBLATION REFLEX ULTRA 45

## (undated) DEVICE — CANNULA, TWIST-IN,  6MM X 7CM

## (undated) DEVICE — SUTURE, ETHILON, 3-0, 18 IN, PS1, BLACK

## (undated) DEVICE — STRIP, SKIN CLOSURE, STERI STRIP, REINFORCED, 0.5 X 4 IN

## (undated) DEVICE — CATHETER, DRAINAGE, NASOGASTRIC, SUMP, SALEM, W/ANTI-REFLUX VALVE, 18 FR, 48 IN

## (undated) DEVICE — DRESSING, GAUZE, SPONGE, 8 PLY, CURITY, 2 X 2 IN, STERILE

## (undated) DEVICE — SYRINGE, 10 CC, LUER LOCK

## (undated) DEVICE — KIT, BEACH CHAIR TRIMANO

## (undated) DEVICE — EXCAL 4MM X 13CM SINGLE

## (undated) DEVICE — GLOVE, SURGICAL, PROTEXIS PI BLUE W/NEUTHERA, 6.5, PF, LF

## (undated) DEVICE — NEEDLE, ANESTHESIA, REINFORCED, 27G X 3 1/2IN

## (undated) DEVICE — BLANKET, LOWER BODY, VHA PLUS, ADULT

## (undated) DEVICE — HOLSTER, JET SAFETY

## (undated) DEVICE — ELECTRODE, ELECTROSURGICAL, COAGULATOR, W/SUCTION, HANDSWITCHING, 8 FR, 6 IN

## (undated) DEVICE — DRESSING, TRANSPARENT, TEGADERM, FRAME STYLE, 4 X 4.5, STRL

## (undated) DEVICE — BLADE, FUSION 4.3 X 13 ROTATABLE

## (undated) DEVICE — TUBING, DUAL WAVE, OUTFLOW

## (undated) DEVICE — TUBING, PUMP MAIN 16FT STERILE

## (undated) DEVICE — SUTURE, MONOCRYL, 3-0, 27 IN, PS-2, UNDYED

## (undated) DEVICE — SPONGE, GAUZE, XRAY DECT, 16 PLY, 4 X 4, W/MASTER DMT,STERILE

## (undated) DEVICE — FIBERTAK, STRAIGHT KIT, DISP

## (undated) DEVICE — GLOVE, SURGICAL, PROTEXIS PI , 6.5, PF, LF

## (undated) DEVICE — PACKING, NASAL, STANDARD, W/STRING, MEROCEL, 4.5 CM

## (undated) DEVICE — GLOVE, SURGICAL, PROTEXIS NEOPRENE, 7.5, PF, LF

## (undated) DEVICE — TUBING, SUCTION, 6MM X 10, CLEAN N-COND

## (undated) DEVICE — DRESSING, NON-ADHERENT, TELFA, OUCHLESS, 3 X 8 IN, STERILE

## (undated) DEVICE — GOWN, SURGICAL, SIRUS, NON REINFORCED, LARGE

## (undated) DEVICE — NEEDLE, SPINAL, 25 G X 3.5 IN, BLUE HUB

## (undated) DEVICE — PROBE, APOLLO RF, 90 DEG, EXTRA LARTGE

## (undated) DEVICE — SUTURE, ETHILON, 3-0, 18 IN, PS2, BLACK

## (undated) DEVICE — BONECUTTER, COOLCUT TORPEDO, 4.0MM X 13CM